# Patient Record
Sex: FEMALE | Race: WHITE | NOT HISPANIC OR LATINO | Employment: OTHER | URBAN - METROPOLITAN AREA
[De-identification: names, ages, dates, MRNs, and addresses within clinical notes are randomized per-mention and may not be internally consistent; named-entity substitution may affect disease eponyms.]

---

## 2018-07-19 ENCOUNTER — TRANSCRIBE ORDERS (OUTPATIENT)
Dept: ADMINISTRATIVE | Facility: HOSPITAL | Age: 68
End: 2018-07-19

## 2018-07-19 DIAGNOSIS — R22.0 INTRACRANIAL SWELLING: Primary | ICD-10-CM

## 2018-07-19 DIAGNOSIS — R22.0 MASS OF HEAD: ICD-10-CM

## 2018-07-24 ENCOUNTER — HOSPITAL ENCOUNTER (OUTPATIENT)
Dept: CT IMAGING | Facility: HOSPITAL | Age: 68
Discharge: HOME | End: 2018-07-24
Payer: COMMERCIAL

## 2018-07-24 DIAGNOSIS — R22.0 MASS OF HEAD: ICD-10-CM

## 2018-07-24 PROCEDURE — 70486 CT MAXILLOFACIAL W/O DYE: CPT

## 2020-02-17 ENCOUNTER — HOSPITAL ENCOUNTER (EMERGENCY)
Facility: HOSPITAL | Age: 70
Discharge: HOME | End: 2020-02-17
Attending: EMERGENCY MEDICINE
Payer: COMMERCIAL

## 2020-02-17 ENCOUNTER — HOSPITAL ENCOUNTER (OUTPATIENT)
Dept: CT IMAGING | Facility: HOSPITAL | Age: 70
Discharge: HOME | End: 2020-02-17
Payer: COMMERCIAL

## 2020-02-17 ENCOUNTER — TRANSCRIBE ORDERS (OUTPATIENT)
Dept: ADMINISTRATIVE | Facility: HOSPITAL | Age: 70
End: 2020-02-17

## 2020-02-17 VITALS
DIASTOLIC BLOOD PRESSURE: 81 MMHG | TEMPERATURE: 97.4 F | HEART RATE: 64 BPM | BODY MASS INDEX: 29.26 KG/M2 | SYSTOLIC BLOOD PRESSURE: 179 MMHG | RESPIRATION RATE: 18 BRPM | WEIGHT: 140 LBS | OXYGEN SATURATION: 97 %

## 2020-02-17 DIAGNOSIS — S09.90XA INJURY OF HEAD, INITIAL ENCOUNTER: Primary | ICD-10-CM

## 2020-02-17 DIAGNOSIS — S09.90XA HEAD INJURY: ICD-10-CM

## 2020-02-17 DIAGNOSIS — M54.2 NECK PAIN: ICD-10-CM

## 2020-02-17 DIAGNOSIS — R51.9 FACIAL PAIN: ICD-10-CM

## 2020-02-17 DIAGNOSIS — S09.90XA INJURY OF HEAD, INITIAL ENCOUNTER: ICD-10-CM
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
## 2020-02-17 DIAGNOSIS — S02.85XA: ICD-10-CM

## 2020-02-17 PROCEDURE — 99282 EMERGENCY DEPT VISIT SF MDM: CPT | Performed by: SURGERY

## 2020-02-17 PROCEDURE — 99284 EMERGENCY DEPT VISIT MOD MDM: CPT | Performed by: EMERGENCY MEDICINE

## 2020-02-17 PROCEDURE — 99283 EMERGENCY DEPT VISIT LOW MDM: CPT

## 2020-02-17 PROCEDURE — 70450 CT HEAD/BRAIN W/O DYE: CPT

## 2020-02-18 NOTE — ED ATTENDING ATTESTATION
2/17/2020  Roque Kee DO, saw and evaluated the patient  I have discussed the patient with the resident/non-physician practitioner and agree with the resident's/non-physician practitioner's findings, Plan of Care, and MDM as documented in the resident's/non-physician practitioner's note, except where noted  All available labs and Radiology studies were reviewed  I was present for key portions of any procedure(s) performed by the resident/non-physician practitioner and I was immediately available to provide assistance  At this point I agree with the current assessment done in the Emergency Department  I have conducted an independent evaluation of this patient a history and physical is as follows:    67 yo female presents for evaluation of multiple facial fx identified on CT following a fall a couple days ago  Pt offers no c/o  CT as follows:  1  Comminuted fracture left zygomatic arch  2   Comminuted fractures of the anterior and posterior walls of the left maxillary sinus  Oral maxillofacial evaluation/trauma evaluation advised  3   Comminuted fractures of the greater wing of the sphenoid bone and orbital floor on the left  In aggregate findings compatible with a zygomaticomaxillary complex  (ZMC) type fracture on the left  4   No acute intracranial hemorrhage, mass effect or edema  5   Moderate, chronic microangiopathy        Imp: multiple facial fx  Plan: d/w OMS, trauma        ED Course         Critical Care Time  Procedures

## 2020-02-18 NOTE — ED PROVIDER NOTES
History  Chief Complaint   Patient presents with    Fall     Pt reports falling down 4-5 stairs and hit her head  The fall happened last sunday, pt now c/o of numbness on the left side of her nose and upper lip      Patient presents for evaluation of facial injuries  She states that last week she was in [facility] when she was going down some steps, misjudged them, and fell down approximately 4-6 steps striking the left side of her face on the ground  Immediately afterwards she had onset of pain on the left side and could hear what was going on around her, however, could not originally moved for couple of seconds  She was then evaluated by EMS who instructed her to be seen at hospital should any of her symptoms worsen  Since then she has had some discomfort on the left side of her face as well as some numbness on the left side of her nose and left medial upper lip  Denies any changes in vision, radiation of pain, headaches, nausea/vomiting  A CT scan of her face was ordered by her primary care physician which showed multiple fractures she was instructed come into emergency department to be further evaluated  Prior to Admission Medications   Prescriptions Last Dose Informant Patient Reported?  Taking?   doxazosin (CARDURA) 2 mg tablet Not Taking at Unknown time  Yes No   Sig: Take 2 mg by mouth daily at bedtime   hydrochlorothiazide (HYDRODIURIL) 25 mg tablet 2/16/2020 at 2100  Yes Yes   Sig: Take 25 mg by mouth daily   lisinopril (ZESTRIL) 40 mg tablet 2/16/2020 at 2100  Yes Yes   Sig: Take 40 mg by mouth daily   metoprolol tartrate (LOPRESSOR) 100 mg tablet 2/16/2020 at 2100  Yes Yes   Sig: Take 100 mg by mouth 2 (two) times a day   potassium chloride SA (KLOR-CON M15) 15 MEQ tablet 2/16/2020 at 2100  Yes Yes   Sig: Take 15 mEq by mouth 2 (two) times a day      Facility-Administered Medications: None       Past Medical History:   Diagnosis Date    Hypertension        Past Surgical History: Procedure Laterality Date    APPENDECTOMY         History reviewed  No pertinent family history  I have reviewed and agree with the history as documented  Social History     Tobacco Use    Smoking status: Never Smoker    Smokeless tobacco: Never Used   Substance Use Topics    Alcohol use: No    Drug use: No        Review of Systems   Constitutional: Negative for chills, diaphoresis, fatigue and fever  Eyes: Negative for pain and visual disturbance  Respiratory: Negative for cough and shortness of breath  Cardiovascular: Negative for chest pain and palpitations  Gastrointestinal: Negative for abdominal distention, abdominal pain, constipation, diarrhea, nausea and vomiting  Genitourinary: Negative for dysuria, frequency and hematuria  Musculoskeletal: Negative for arthralgias, myalgias and neck pain  Neurological: Positive for numbness  Negative for dizziness, syncope, light-headedness and headaches  All other systems reviewed and are negative  Physical Exam  ED Triage Vitals   Temperature Pulse Respirations Blood Pressure SpO2   02/17/20 1735 02/17/20 1735 02/17/20 1735 02/17/20 1735 02/17/20 1735   (!) 97 4 °F (36 3 °C) 71 16 (!) 182/102 100 %      Temp Source Heart Rate Source Patient Position - Orthostatic VS BP Location FiO2 (%)   02/17/20 1735 02/17/20 1735 02/17/20 1735 02/17/20 1735 --   Oral Monitor Sitting Left arm       Pain Score       02/17/20 2000       No Pain             Orthostatic Vital Signs  Vitals:    02/17/20 1735 02/17/20 1921 02/17/20 2000   BP: (!) 182/102 (!) 208/93 (!) 179/81   Pulse: 71 60 64   Patient Position - Orthostatic VS: Sitting Lying Lying       Physical Exam   Constitutional: She is oriented to person, place, and time  She appears well-nourished  No distress  HENT:   Head: Normocephalic         Right Ear: External ear normal    Left Ear: External ear normal    Mouth/Throat: Oropharynx is clear and moist    Eyes: Pupils are equal, round, and reactive to light  Conjunctivae and EOM are normal  Right eye exhibits no discharge  Left eye exhibits no discharge  No scleral icterus  No changes in vision, no pain with extraocular motion   Neck: Normal range of motion  Neck supple  No JVD present  Cardiovascular: Normal rate, regular rhythm, normal heart sounds and intact distal pulses  Exam reveals no gallop and no friction rub  No murmur heard  Pulmonary/Chest: Effort normal and breath sounds normal  No respiratory distress  She has no wheezes  She has no rales  Abdominal: Soft  Bowel sounds are normal  She exhibits no distension and no mass  There is no tenderness  There is no guarding  Musculoskeletal: Normal range of motion  She exhibits no tenderness or deformity  Neurological: She is alert and oriented to person, place, and time  No cranial nerve deficit or sensory deficit  She exhibits normal muscle tone  Coordination normal    Skin: Skin is warm  She is not diaphoretic  Psychiatric: She has a normal mood and affect  Her behavior is normal  Judgment and thought content normal    Vitals reviewed  ED Medications  Medications - No data to display    Diagnostic Studies  Results Reviewed     None                 No orders to display         Procedures  Procedures      ED Course           Identification of Seniors at Risk      Most Recent Value   (ISAR) Identification of Seniors at Risk   Before the illness or injury that brought you to the Emergency, did you need someone to help you on a regular basis? 0 Filed at: 02/17/2020 1737   In the last 24 hours, have you needed more help than usual?  0 Filed at: 02/17/2020 1737   Have you been hospitalized for one or more nights during the past 6 months? 0 Filed at: 02/17/2020 1737   In general, do you see well?  0 Filed at: 02/17/2020 1737   In general, do you have serious problems with your memory? 0 Filed at: 02/17/2020 1737   Do you take more than three different medications every day?   1 Filed at: 02/17/2020 1737   ISAR Score  1 Filed at: 02/17/2020 1737                          [facility]  Number of Diagnoses or Management Options  Fall:   Head injury:   Orbit fracture, left:   Diagnosis management comments: Patient was seen and evaluated by Trauma  I discussed the patient with OMFS on-call who indicated that her current injuries were not surgical in nature and that she could follow-up with them in their office on an outpatient basis  Discharged with instructions to call and set up an appointment with OMFS, return to emergency department for any changes in vision, severe pain  Disposition  Final diagnoses:   Fall   Head injury   Orbit fracture, left     Time reflects when diagnosis was documented in both MDM as applicable and the Disposition within this note     Time User Action Codes Description Comment    2/17/2020  8:28 PM Marcello Reid Add [W19  XXXA] Fall     2/17/2020  8:28 PM Marcello Reid Add [Q33 28XS] Head injury     2/17/2020  8:29 PM Mary Cueva Add [T70 05MK] Orbit fracture, left       ED Disposition     ED Disposition Condition Date/Time Comment    Discharge Stable Mon Feb 17, 2020  8:28 PM [facility] discharge to home/self care  Follow-up Information     Follow up With Specialties Details Why [address], MD Internal Medicine   [address] Oral Maxillofacial Surgery Schedule an appointment as soon as possible for a visit in 1 week Your care was discussed with this physician during your ED visit   Please make an appointment with them for follow up  [facility] [address] [place] [address]  [phone]            Discharge Medication List as of 2/17/2020  8:42 PM      CONTINUE these medications which have NOT CHANGED    Details   hydrochlorothiazide (HYDRODIURIL) 25 mg tablet Take 25 mg by mouth daily, Until Discontinued, Historical Med      lisinopril (ZESTRIL) 40 mg tablet Take 40 mg by mouth daily, Until Discontinued, Historical Med      metoprolol tartrate (LOPRESSOR) 100 mg tablet Take 100 mg by mouth 2 (two) times a day, Until Discontinued, Historical Med      potassium chloride SA (KLOR-CON M15) 15 MEQ tablet Take 15 mEq by mouth 2 (two) times a day, Until Discontinued, Historical Med      doxazosin (CARDURA) 2 mg tablet Take 2 mg by mouth daily at bedtime, Until Discontinued, Historical Med           No discharge procedures on file  ED Provider  Attending physically available and evaluated Marycarmelo Gisele CARABALLO managed the patient along with the ED Attending      Electronically Signed by         Nathaniel Mcgrath MD  02/17/20 2749

## 2020-02-18 NOTE — CONSULTS
Evaluation - Trauma   Terese Terry 68 y o  female MRN: [id number]  Unit/Bed#: ED 14 Encounter: [id number]    Assessment/Plan   Trauma Alert: Evaluation  Model of Arrival: Ambulance  Trauma Team: Attending Isai Isbell, Residents Bud Villa and Fellow Petit-Me  Consultants: None    Trauma Active Problems: Multiple L-sided facial fractures    Trauma Plan: discharge with outpatient OMFS follow up    Chief Complaint:   L-sided facial pain  History of Present Illness   Physician Requesting Evaluation: No att  providers found  Reason for Evaluation / Principal Problem: transferred for further evaluation by PCP  HPI:  Elayne Alvarez is a 68 y o  female who presents at the request of her PCP  Patient was walking down a flight of concrete steps eight days ago while on vacation in [place] and   She fell down the steps because she feels like she was just going too fast and her feet couldn't catch up and she fell forward  Patient landed on her face  She did not lose consciousness and doesn't take any AC/AP medications  Patient was specifically asked about aspirin, coumadin, and the noacs  She denied them all  She finished her vacation, flew back home, and has still been having pain  She went to her PCP, who ordered an x-ray and then sent her to the ED who ordered a CT scan, who then sent her here for a trauma/OMFS eval  Patient denies any pain but does state the area of skin inferior to her orbit is slightly numb  Denies any maxillary teeth numbness  Mechanism:Fall    Review of Systems   Constitutional: Negative for chills, fatigue and fever  HENT: Negative for congestion, rhinorrhea, sinus pressure and sore throat  Facial tenderness   Eyes: Negative for visual disturbance  Respiratory: Negative for cough and shortness of breath  Cardiovascular: Negative for chest pain  Gastrointestinal: Negative for abdominal pain, constipation, diarrhea, nausea and vomiting     Genitourinary: Negative for dysuria, frequency, hematuria and urgency  Musculoskeletal: Negative for arthralgias and myalgias  Skin: Negative for color change and rash  Neurological: Negative for dizziness, light-headedness and numbness  Historical Information   History is unobtainable from the patient due to nothing  Efforts to obtain history included the following sources: family member, obtained from other records    Past Medical History:   Diagnosis Date    Hypertension      Past Surgical History:   Procedure Laterality Date    APPENDECTOMY       Social History   Social History     Substance and Sexual Activity   Alcohol Use No     Social History     Substance and Sexual Activity   Drug Use No     Social History     Tobacco Use   Smoking Status Never Smoker   Smokeless Tobacco Never Used       There is no immunization history on file for this patient  Last Tetanus: unknown  Family History: Non-contributory  Unable to obtain/limited by nothing      Meds/Allergies   all current active meds have been reviewed and current meds:   No current facility-administered medications for this encounter  No Known Allergies      Objective   Vitals:   First set: Temperature: (!) 97 4 °F (36 3 °C) (02/17/20 1735)  Pulse: 71 (02/17/20 1735)  Respirations: 16 (02/17/20 1735)  Blood Pressure: (!) 182/102 (02/17/20 1735)    Invasive Devices     None                 Neurologic Exam     Mental Status   Oriented to person, place, and time  Cranial Nerves     CN III, IV, VI   Pupils are equal, round, and reactive to light  Extraocular motions are normal      Physical Exam   Constitutional: She is oriented to person, place, and time  She appears well-developed and well-nourished  No distress  HENT:   Head: Normocephalic and atraumatic  Facial tenderness with small abrasion  Minimal edema  Decreased sensation just inferior to L orbit when compared to the R    Eyes: Pupils are equal, round, and reactive to light   Conjunctivae and EOM are normal  Right eye exhibits no discharge  Left eye exhibits no discharge  No scleral icterus  Neck: Normal range of motion  Neck supple  No JVD present  Cardiovascular: Normal rate, regular rhythm, normal heart sounds and intact distal pulses  Exam reveals no gallop and no friction rub  No murmur heard  Pulmonary/Chest: Effort normal and breath sounds normal  No stridor  No respiratory distress  She has no wheezes  She has no rales  She exhibits no tenderness  Abdominal: Soft  Bowel sounds are normal  She exhibits no distension  There is no tenderness  There is no guarding  Musculoskeletal: Normal range of motion  She exhibits no edema, tenderness or deformity  Neurological: She is alert and oriented to person, place, and time  No cranial nerve deficit or sensory deficit  She exhibits normal muscle tone  Skin: Skin is warm and dry  No rash noted  She is not diaphoretic  No erythema  No pallor  Psychiatric: She has a normal mood and affect  Her behavior is normal    Nursing note and vitals reviewed  PE limited by: nothing    Lab Results: no labs drawn  Imaging/EKG Studies: Ct Head Wo Contrast    Result Date: 2/17/2020  Impression: 1  Comminuted fracture left zygomatic arch  2   Comminuted fractures of the anterior and posterior walls of the left maxillary sinus  Oral maxillofacial evaluation/trauma evaluation advised  3   Comminuted fractures of the greater wing of the sphenoid bone and orbital floor on the left  In aggregate findings compatible with a zygomaticomaxillary complex  (ZMC) type fracture on the left  4   No acute intracranial hemorrhage, mass effect or edema  5   Moderate, chronic microangiopathy  Workstation performed: [id number]     Other Studies: none    Code Status: No Order  Advance Directive and Living Will:      Power of :    POLST:      Counseling / Coordination of Care  Total Critical Care time spent 15 minutes excluding procedures, teaching and family updates

## 2020-02-19 ENCOUNTER — HOSPITAL ENCOUNTER (OUTPATIENT)
Dept: RADIOLOGY | Facility: HOSPITAL | Age: 70
Discharge: HOME | End: 2020-02-19
Payer: COMMERCIAL

## 2020-02-19 DIAGNOSIS — S09.90XA INJURY OF HEAD, INITIAL ENCOUNTER: ICD-10-CM

## 2020-02-19 DIAGNOSIS — M54.2 NECK PAIN: ICD-10-CM

## 2020-02-19 PROCEDURE — 72141 MRI NECK SPINE W/O DYE: CPT

## 2020-03-04 ENCOUNTER — APPOINTMENT (OUTPATIENT)
Dept: RADIOLOGY | Facility: AMBULARY SURGERY CENTER | Age: 70
End: 2020-03-04
Attending: ORTHOPAEDIC SURGERY
Payer: COMMERCIAL

## 2020-03-04 ENCOUNTER — OFFICE VISIT (OUTPATIENT)
Dept: OBGYN CLINIC | Facility: CLINIC | Age: 70
End: 2020-03-04
Payer: COMMERCIAL

## 2020-03-04 VITALS
HEART RATE: 66 BPM | HEIGHT: 58 IN | BODY MASS INDEX: 29.39 KG/M2 | SYSTOLIC BLOOD PRESSURE: 188 MMHG | WEIGHT: 140 LBS | DIASTOLIC BLOOD PRESSURE: 83 MMHG

## 2020-03-04 DIAGNOSIS — M25.511 ACUTE PAIN OF RIGHT SHOULDER: ICD-10-CM

## 2020-03-04 DIAGNOSIS — S63.681A SPRAIN OF OTHER SITE OF RIGHT THUMB, INITIAL ENCOUNTER: ICD-10-CM

## 2020-03-04 DIAGNOSIS — M79.644 THUMB PAIN, RIGHT: ICD-10-CM

## 2020-03-04 DIAGNOSIS — S43.014A ANTERIOR DISLOCATION OF RIGHT SHOULDER, INITIAL ENCOUNTER: Primary | ICD-10-CM

## 2020-03-04 PROCEDURE — 73140 X-RAY EXAM OF FINGER(S): CPT

## 2020-03-04 PROCEDURE — 99203 OFFICE O/P NEW LOW 30 MIN: CPT | Performed by: ORTHOPAEDIC SURGERY

## 2020-03-04 PROCEDURE — 73030 X-RAY EXAM OF SHOULDER: CPT
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
## 2020-04-28 ENCOUNTER — OFFICE VISIT (OUTPATIENT)
Dept: OBGYN CLINIC | Facility: CLINIC | Age: 70
End: 2020-04-28
Payer: COMMERCIAL

## 2020-04-28 VITALS
HEIGHT: 58 IN | WEIGHT: 140 LBS | SYSTOLIC BLOOD PRESSURE: 185 MMHG | HEART RATE: 80 BPM | BODY MASS INDEX: 29.39 KG/M2 | DIASTOLIC BLOOD PRESSURE: 105 MMHG

## 2020-04-28 DIAGNOSIS — S43.004D DISLOCATION OF RIGHT SHOULDER JOINT, SUBSEQUENT ENCOUNTER: Primary | ICD-10-CM

## 2020-04-28 PROBLEM — S43.004A DISLOCATION OF RIGHT SHOULDER JOINT: Status: ACTIVE | Noted: 2020-04-28

## 2020-04-28 PROCEDURE — 99213 OFFICE O/P EST LOW 20 MIN: CPT | Performed by: ORTHOPAEDIC SURGERY

## 2020-04-30 ENCOUNTER — EVALUATION (OUTPATIENT)
Dept: PHYSICAL THERAPY | Facility: CLINIC | Age: 70
End: 2020-04-30
Payer: COMMERCIAL

## 2020-04-30 DIAGNOSIS — S43.004D DISLOCATION OF RIGHT SHOULDER JOINT, SUBSEQUENT ENCOUNTER: ICD-10-CM

## 2020-04-30 PROCEDURE — 97110 THERAPEUTIC EXERCISES: CPT | Performed by: PHYSICAL THERAPIST

## 2020-04-30 PROCEDURE — 97161 PT EVAL LOW COMPLEX 20 MIN: CPT | Performed by: PHYSICAL THERAPIST

## 2020-05-04 ENCOUNTER — OFFICE VISIT (OUTPATIENT)
Dept: PHYSICAL THERAPY | Facility: CLINIC | Age: 70
End: 2020-05-04
Payer: COMMERCIAL

## 2020-05-04 DIAGNOSIS — S43.004D DISLOCATION OF RIGHT SHOULDER JOINT, SUBSEQUENT ENCOUNTER: Primary | ICD-10-CM

## 2020-05-04 PROCEDURE — 97110 THERAPEUTIC EXERCISES: CPT | Performed by: PHYSICAL THERAPIST

## 2020-05-04 PROCEDURE — 97140 MANUAL THERAPY 1/> REGIONS: CPT | Performed by: PHYSICAL THERAPIST

## 2020-05-04 PROCEDURE — 97112 NEUROMUSCULAR REEDUCATION: CPT | Performed by: PHYSICAL THERAPIST

## 2020-05-07 ENCOUNTER — OFFICE VISIT (OUTPATIENT)
Dept: PHYSICAL THERAPY | Facility: CLINIC | Age: 70
End: 2020-05-07
Payer: COMMERCIAL

## 2020-05-07 DIAGNOSIS — S43.004D DISLOCATION OF RIGHT SHOULDER JOINT, SUBSEQUENT ENCOUNTER: Primary | ICD-10-CM

## 2020-05-07 PROCEDURE — 97140 MANUAL THERAPY 1/> REGIONS: CPT | Performed by: PHYSICAL THERAPIST

## 2020-05-07 PROCEDURE — 97112 NEUROMUSCULAR REEDUCATION: CPT | Performed by: PHYSICAL THERAPIST

## 2020-05-07 PROCEDURE — 97110 THERAPEUTIC EXERCISES: CPT | Performed by: PHYSICAL THERAPIST

## 2020-05-12 ENCOUNTER — OFFICE VISIT (OUTPATIENT)
Dept: PHYSICAL THERAPY | Facility: CLINIC | Age: 70
End: 2020-05-12
Payer: COMMERCIAL

## 2020-05-12 DIAGNOSIS — S43.004D DISLOCATION OF RIGHT SHOULDER JOINT, SUBSEQUENT ENCOUNTER: Primary | ICD-10-CM

## 2020-05-12 PROCEDURE — 97110 THERAPEUTIC EXERCISES: CPT | Performed by: PHYSICAL THERAPIST

## 2020-05-12 PROCEDURE — 97140 MANUAL THERAPY 1/> REGIONS: CPT | Performed by: PHYSICAL THERAPIST

## 2020-05-12 PROCEDURE — 97112 NEUROMUSCULAR REEDUCATION: CPT | Performed by: PHYSICAL THERAPIST

## 2020-05-15 ENCOUNTER — OFFICE VISIT (OUTPATIENT)
Dept: PHYSICAL THERAPY | Facility: CLINIC | Age: 70
End: 2020-05-15
Payer: COMMERCIAL

## 2020-05-15 DIAGNOSIS — S43.004D DISLOCATION OF RIGHT SHOULDER JOINT, SUBSEQUENT ENCOUNTER: Primary | ICD-10-CM

## 2020-05-15 PROCEDURE — 97112 NEUROMUSCULAR REEDUCATION: CPT | Performed by: PHYSICAL THERAPIST

## 2020-05-15 PROCEDURE — 97140 MANUAL THERAPY 1/> REGIONS: CPT | Performed by: PHYSICAL THERAPIST

## 2020-05-15 PROCEDURE — 97110 THERAPEUTIC EXERCISES: CPT | Performed by: PHYSICAL THERAPIST

## 2020-05-19 ENCOUNTER — OFFICE VISIT (OUTPATIENT)
Dept: PHYSICAL THERAPY | Facility: CLINIC | Age: 70
End: 2020-05-19
Payer: COMMERCIAL

## 2020-05-19 DIAGNOSIS — S43.004D DISLOCATION OF RIGHT SHOULDER JOINT, SUBSEQUENT ENCOUNTER: Primary | ICD-10-CM

## 2020-05-19 PROCEDURE — 97112 NEUROMUSCULAR REEDUCATION: CPT | Performed by: PHYSICAL THERAPIST

## 2020-05-19 PROCEDURE — 97140 MANUAL THERAPY 1/> REGIONS: CPT | Performed by: PHYSICAL THERAPIST

## 2020-05-19 PROCEDURE — 97110 THERAPEUTIC EXERCISES: CPT | Performed by: PHYSICAL THERAPIST

## 2020-05-27 ENCOUNTER — EVALUATION (OUTPATIENT)
Dept: PHYSICAL THERAPY | Facility: CLINIC | Age: 70
End: 2020-05-27
Payer: COMMERCIAL

## 2020-05-27 DIAGNOSIS — S43.004D DISLOCATION OF RIGHT SHOULDER JOINT, SUBSEQUENT ENCOUNTER: Primary | ICD-10-CM

## 2020-05-27 PROCEDURE — 97110 THERAPEUTIC EXERCISES: CPT | Performed by: PHYSICAL THERAPIST

## 2020-05-27 PROCEDURE — 97112 NEUROMUSCULAR REEDUCATION: CPT | Performed by: PHYSICAL THERAPIST

## 2020-05-27 PROCEDURE — 97140 MANUAL THERAPY 1/> REGIONS: CPT | Performed by: PHYSICAL THERAPIST

## 2020-06-03 ENCOUNTER — OFFICE VISIT (OUTPATIENT)
Dept: PHYSICAL THERAPY | Facility: CLINIC | Age: 70
End: 2020-06-03
Payer: COMMERCIAL

## 2020-06-03 DIAGNOSIS — S43.004D DISLOCATION OF RIGHT SHOULDER JOINT, SUBSEQUENT ENCOUNTER: Primary | ICD-10-CM

## 2020-06-03 PROCEDURE — 97110 THERAPEUTIC EXERCISES: CPT | Performed by: PHYSICAL THERAPIST

## 2020-06-03 PROCEDURE — 97140 MANUAL THERAPY 1/> REGIONS: CPT | Performed by: PHYSICAL THERAPIST

## 2020-06-03 PROCEDURE — 97112 NEUROMUSCULAR REEDUCATION: CPT | Performed by: PHYSICAL THERAPIST

## 2020-06-09 ENCOUNTER — APPOINTMENT (OUTPATIENT)
Dept: PHYSICAL THERAPY | Facility: CLINIC | Age: 70
End: 2020-06-09
Payer: COMMERCIAL

## 2020-06-19 ENCOUNTER — OFFICE VISIT (OUTPATIENT)
Dept: PHYSICAL THERAPY | Facility: CLINIC | Age: 70
End: 2020-06-19
Payer: COMMERCIAL

## 2020-06-19 DIAGNOSIS — S43.004D DISLOCATION OF RIGHT SHOULDER JOINT, SUBSEQUENT ENCOUNTER: Primary | ICD-10-CM

## 2020-06-19 PROCEDURE — 97112 NEUROMUSCULAR REEDUCATION: CPT | Performed by: PHYSICAL THERAPIST

## 2020-06-19 PROCEDURE — 97110 THERAPEUTIC EXERCISES: CPT | Performed by: PHYSICAL THERAPIST

## 2020-06-19 PROCEDURE — 97140 MANUAL THERAPY 1/> REGIONS: CPT | Performed by: PHYSICAL THERAPIST

## 2020-06-25 ENCOUNTER — OFFICE VISIT (OUTPATIENT)
Dept: PHYSICAL THERAPY | Facility: CLINIC | Age: 70
End: 2020-06-25
Payer: COMMERCIAL

## 2020-06-25 DIAGNOSIS — S43.004D DISLOCATION OF RIGHT SHOULDER JOINT, SUBSEQUENT ENCOUNTER: Primary | ICD-10-CM

## 2020-06-25 PROCEDURE — 97112 NEUROMUSCULAR REEDUCATION: CPT | Performed by: PHYSICAL THERAPIST

## 2020-06-25 PROCEDURE — 97110 THERAPEUTIC EXERCISES: CPT | Performed by: PHYSICAL THERAPIST

## 2020-06-25 PROCEDURE — 97140 MANUAL THERAPY 1/> REGIONS: CPT | Performed by: PHYSICAL THERAPIST

## 2020-06-30 ENCOUNTER — EVALUATION (OUTPATIENT)
Dept: PHYSICAL THERAPY | Facility: CLINIC | Age: 70
End: 2020-06-30
Payer: COMMERCIAL

## 2020-06-30 DIAGNOSIS — S43.004D DISLOCATION OF RIGHT SHOULDER JOINT, SUBSEQUENT ENCOUNTER: Primary | ICD-10-CM

## 2020-06-30 PROCEDURE — 97140 MANUAL THERAPY 1/> REGIONS: CPT | Performed by: PHYSICAL THERAPIST

## 2020-06-30 PROCEDURE — 97110 THERAPEUTIC EXERCISES: CPT | Performed by: PHYSICAL THERAPIST

## 2020-10-08 ENCOUNTER — EVALUATION (OUTPATIENT)
Dept: PHYSICAL THERAPY | Facility: CLINIC | Age: 70
End: 2020-10-08
Payer: COMMERCIAL

## 2020-10-08 DIAGNOSIS — G89.29 CHRONIC RIGHT SHOULDER PAIN: Primary | ICD-10-CM

## 2020-10-08 DIAGNOSIS — M25.511 CHRONIC RIGHT SHOULDER PAIN: Primary | ICD-10-CM

## 2020-10-08 PROCEDURE — 97110 THERAPEUTIC EXERCISES: CPT | Performed by: PHYSICAL THERAPIST

## 2020-10-08 PROCEDURE — 97140 MANUAL THERAPY 1/> REGIONS: CPT | Performed by: PHYSICAL THERAPIST

## 2020-10-08 PROCEDURE — 97161 PT EVAL LOW COMPLEX 20 MIN: CPT | Performed by: PHYSICAL THERAPIST

## 2020-10-12 ENCOUNTER — OFFICE VISIT (OUTPATIENT)
Dept: PHYSICAL THERAPY | Facility: CLINIC | Age: 70
End: 2020-10-12
Payer: COMMERCIAL

## 2020-10-12 DIAGNOSIS — M25.511 CHRONIC RIGHT SHOULDER PAIN: Primary | ICD-10-CM

## 2020-10-12 DIAGNOSIS — G89.29 CHRONIC RIGHT SHOULDER PAIN: Primary | ICD-10-CM

## 2020-10-12 PROCEDURE — 97140 MANUAL THERAPY 1/> REGIONS: CPT | Performed by: PHYSICAL THERAPIST

## 2020-10-12 PROCEDURE — 97110 THERAPEUTIC EXERCISES: CPT | Performed by: PHYSICAL THERAPIST

## 2020-10-12 PROCEDURE — 97112 NEUROMUSCULAR REEDUCATION: CPT | Performed by: PHYSICAL THERAPIST

## 2020-10-16 ENCOUNTER — OFFICE VISIT (OUTPATIENT)
Dept: PHYSICAL THERAPY | Facility: CLINIC | Age: 70
End: 2020-10-16
Payer: COMMERCIAL

## 2020-10-16 DIAGNOSIS — M25.511 CHRONIC RIGHT SHOULDER PAIN: Primary | ICD-10-CM

## 2020-10-16 DIAGNOSIS — G89.29 CHRONIC RIGHT SHOULDER PAIN: Primary | ICD-10-CM

## 2020-10-16 PROCEDURE — 97112 NEUROMUSCULAR REEDUCATION: CPT | Performed by: PHYSICAL THERAPIST

## 2020-10-16 PROCEDURE — 97110 THERAPEUTIC EXERCISES: CPT | Performed by: PHYSICAL THERAPIST

## 2020-10-16 PROCEDURE — 97140 MANUAL THERAPY 1/> REGIONS: CPT | Performed by: PHYSICAL THERAPIST

## 2020-10-19 ENCOUNTER — OFFICE VISIT (OUTPATIENT)
Dept: PHYSICAL THERAPY | Facility: CLINIC | Age: 70
End: 2020-10-19
Payer: COMMERCIAL

## 2020-10-19 DIAGNOSIS — M25.511 CHRONIC RIGHT SHOULDER PAIN: Primary | ICD-10-CM

## 2020-10-19 DIAGNOSIS — G89.29 CHRONIC RIGHT SHOULDER PAIN: Primary | ICD-10-CM

## 2020-10-19 PROCEDURE — 97140 MANUAL THERAPY 1/> REGIONS: CPT | Performed by: PHYSICAL THERAPIST

## 2020-10-19 PROCEDURE — 97112 NEUROMUSCULAR REEDUCATION: CPT | Performed by: PHYSICAL THERAPIST

## 2020-10-19 PROCEDURE — 97110 THERAPEUTIC EXERCISES: CPT | Performed by: PHYSICAL THERAPIST

## 2020-10-23 ENCOUNTER — OFFICE VISIT (OUTPATIENT)
Dept: PHYSICAL THERAPY | Facility: CLINIC | Age: 70
End: 2020-10-23
Payer: COMMERCIAL

## 2020-10-23 DIAGNOSIS — G89.29 CHRONIC RIGHT SHOULDER PAIN: Primary | ICD-10-CM

## 2020-10-23 DIAGNOSIS — M25.511 CHRONIC RIGHT SHOULDER PAIN: Primary | ICD-10-CM

## 2020-10-23 PROCEDURE — 97140 MANUAL THERAPY 1/> REGIONS: CPT | Performed by: PHYSICAL THERAPIST

## 2020-10-23 PROCEDURE — 97110 THERAPEUTIC EXERCISES: CPT | Performed by: PHYSICAL THERAPIST

## 2020-10-23 PROCEDURE — 97112 NEUROMUSCULAR REEDUCATION: CPT | Performed by: PHYSICAL THERAPIST

## 2020-10-28 ENCOUNTER — OFFICE VISIT (OUTPATIENT)
Dept: PHYSICAL THERAPY | Facility: CLINIC | Age: 70
End: 2020-10-28
Payer: COMMERCIAL

## 2020-10-28 DIAGNOSIS — M25.511 CHRONIC RIGHT SHOULDER PAIN: Primary | ICD-10-CM

## 2020-10-28 DIAGNOSIS — G89.29 CHRONIC RIGHT SHOULDER PAIN: Primary | ICD-10-CM

## 2020-10-28 PROCEDURE — 97140 MANUAL THERAPY 1/> REGIONS: CPT | Performed by: PHYSICAL THERAPIST

## 2020-10-28 PROCEDURE — 97110 THERAPEUTIC EXERCISES: CPT | Performed by: PHYSICAL THERAPIST

## 2020-10-28 PROCEDURE — 97112 NEUROMUSCULAR REEDUCATION: CPT | Performed by: PHYSICAL THERAPIST

## 2020-10-30 ENCOUNTER — OFFICE VISIT (OUTPATIENT)
Dept: PHYSICAL THERAPY | Facility: CLINIC | Age: 70
End: 2020-10-30
Payer: COMMERCIAL

## 2020-10-30 DIAGNOSIS — G89.29 CHRONIC RIGHT SHOULDER PAIN: Primary | ICD-10-CM

## 2020-10-30 DIAGNOSIS — M25.511 CHRONIC RIGHT SHOULDER PAIN: Primary | ICD-10-CM

## 2020-10-30 PROCEDURE — 97110 THERAPEUTIC EXERCISES: CPT | Performed by: PHYSICAL THERAPIST

## 2020-10-30 PROCEDURE — 97140 MANUAL THERAPY 1/> REGIONS: CPT | Performed by: PHYSICAL THERAPIST

## 2020-10-30 PROCEDURE — 97112 NEUROMUSCULAR REEDUCATION: CPT | Performed by: PHYSICAL THERAPIST

## 2020-11-03 ENCOUNTER — OFFICE VISIT (OUTPATIENT)
Dept: PHYSICAL THERAPY | Facility: CLINIC | Age: 70
End: 2020-11-03
Payer: COMMERCIAL

## 2020-11-03 DIAGNOSIS — G89.29 CHRONIC RIGHT SHOULDER PAIN: Primary | ICD-10-CM

## 2020-11-03 DIAGNOSIS — M25.511 CHRONIC RIGHT SHOULDER PAIN: Primary | ICD-10-CM

## 2020-11-03 PROCEDURE — 97112 NEUROMUSCULAR REEDUCATION: CPT | Performed by: PHYSICAL THERAPIST

## 2020-11-03 PROCEDURE — 97110 THERAPEUTIC EXERCISES: CPT | Performed by: PHYSICAL THERAPIST

## 2020-11-03 PROCEDURE — 97140 MANUAL THERAPY 1/> REGIONS: CPT | Performed by: PHYSICAL THERAPIST

## 2020-11-06 ENCOUNTER — OFFICE VISIT (OUTPATIENT)
Dept: PHYSICAL THERAPY | Facility: CLINIC | Age: 70
End: 2020-11-06
Payer: COMMERCIAL

## 2020-11-06 DIAGNOSIS — M25.511 CHRONIC RIGHT SHOULDER PAIN: Primary | ICD-10-CM

## 2020-11-06 DIAGNOSIS — G89.29 CHRONIC RIGHT SHOULDER PAIN: Primary | ICD-10-CM

## 2020-11-06 PROCEDURE — 97110 THERAPEUTIC EXERCISES: CPT | Performed by: PHYSICAL THERAPIST

## 2020-11-06 PROCEDURE — 97140 MANUAL THERAPY 1/> REGIONS: CPT | Performed by: PHYSICAL THERAPIST

## 2020-11-20 ENCOUNTER — EVALUATION (OUTPATIENT)
Dept: PHYSICAL THERAPY | Facility: CLINIC | Age: 70
End: 2020-11-20
Payer: COMMERCIAL

## 2020-11-20 DIAGNOSIS — G89.29 CHRONIC RIGHT SHOULDER PAIN: Primary | ICD-10-CM

## 2020-11-20 DIAGNOSIS — M25.511 CHRONIC RIGHT SHOULDER PAIN: Primary | ICD-10-CM

## 2020-11-20 PROCEDURE — 97140 MANUAL THERAPY 1/> REGIONS: CPT | Performed by: PHYSICAL THERAPIST

## 2020-11-20 PROCEDURE — 97110 THERAPEUTIC EXERCISES: CPT | Performed by: PHYSICAL THERAPIST

## 2022-05-24 ENCOUNTER — TELEPHONE (OUTPATIENT)
Dept: OBGYN CLINIC | Facility: CLINIC | Age: 72
End: 2022-05-24

## 2022-05-24 NOTE — TELEPHONE ENCOUNTER
Pt's son, Christin Aparicio calling for pt  States Terese has been having incontinence issues for awhile and not concerned just thought aging but 3 nights ago "something changed" and pt told her  who assessed and told pt's son that "her insides are coming out " Pt told son this has been happening occasionally over time but now happening more frequent  Last 2 days pt has had constant urge to urinate, pain and burning that increases as the day goes on to the point where she can barely walk at end of the day  Pt taking advil for pain relief "which is a big deal for her because she never took anything ever"  Denies fever/chills  Pt's son Christin Aparicio aware will review and call him back with recommendations

## 2022-05-24 NOTE — TELEPHONE ENCOUNTER
Called pt's son back to make him aware no availability to see pt for acute problem at this time, [facility] office numbers provided to pt's son for attempt for pt to be seen with sooner availability  Pt's son verbalizes understanding

## 2022-05-26 ENCOUNTER — OFFICE VISIT (OUTPATIENT)
Dept: OBGYN CLINIC | Facility: CLINIC | Age: 72
End: 2022-05-26
Payer: COMMERCIAL

## 2022-05-26 VITALS
SYSTOLIC BLOOD PRESSURE: 150 MMHG | DIASTOLIC BLOOD PRESSURE: 82 MMHG | WEIGHT: 141 LBS | HEIGHT: 58 IN | BODY MASS INDEX: 29.6 KG/M2

## 2022-05-26 DIAGNOSIS — R30.0 DYSURIA: Primary | ICD-10-CM

## 2022-05-26 DIAGNOSIS — N81.3 THIRD DEGREE UTERINE PROLAPSE: ICD-10-CM

## 2022-05-26 DIAGNOSIS — R39.9 LOWER URINARY TRACT SYMPTOMS (LUTS): ICD-10-CM

## 2022-05-26 PROBLEM — I10 HYPERTENSION: Status: ACTIVE | Noted: 2022-05-26

## 2022-05-26 LAB
SL AMB  POCT GLUCOSE, UA: ABNORMAL
SL AMB LEUKOCYTE ESTERASE,UA: ABNORMAL
SL AMB POCT BILIRUBIN,UA: ABNORMAL
SL AMB POCT BLOOD,UA: ABNORMAL
SL AMB POCT CLARITY,UA: CLEAR
SL AMB POCT COLOR,UA: YELLOW
SL AMB POCT KETONES,UA: ABNORMAL
SL AMB POCT NITRITE,UA: ABNORMAL
SL AMB POCT PH,UA: 6.5
SL AMB POCT SPECIFIC GRAVITY,UA: 1
SL AMB POCT URINE PROTEIN: ABNORMAL
SL AMB POCT UROBILINOGEN: ABNORMAL

## 2022-05-26 PROCEDURE — 87147 CULTURE TYPE IMMUNOLOGIC: CPT | Performed by: NURSE PRACTITIONER

## 2022-05-26 PROCEDURE — 81002 URINALYSIS NONAUTO W/O SCOPE: CPT | Performed by: NURSE PRACTITIONER

## 2022-05-26 PROCEDURE — 99202 OFFICE O/P NEW SF 15 MIN: CPT | Performed by: NURSE PRACTITIONER

## 2022-05-26 PROCEDURE — 87086 URINE CULTURE/COLONY COUNT: CPT | Performed by: NURSE PRACTITIONER

## 2022-05-26 RX ORDER — METOPROLOL SUCCINATE 100 MG/1
100 TABLET, EXTENDED RELEASE ORAL DAILY
COMMUNITY
Start: 2022-05-19

## 2022-05-26 RX ORDER — NITROFURANTOIN 25; 75 MG/1; MG/1
100 CAPSULE ORAL 2 TIMES DAILY
Qty: 14 CAPSULE | Refills: 0 | Status: SHIPPED | OUTPATIENT
Start: 2022-05-26 | End: 2022-06-02

## 2022-05-26 RX ORDER — AMLODIPINE BESYLATE 5 MG/1
5 TABLET ORAL DAILY
COMMUNITY
Start: 2022-04-26

## 2022-05-26 RX ORDER — POTASSIUM CHLORIDE 750 MG/1
10 TABLET, FILM COATED, EXTENDED RELEASE ORAL DAILY
COMMUNITY
Start: 2022-05-20

## 2022-05-26 NOTE — PROGRESS NOTES
SUBJECTIVE:     66 y o  female   x 3 complains of not having control of her bladder and a burning sensation occasionally  Vaginal Complains pressure  Symptoms have been presents for about 2 weeks  Denies abnormal vaginal bleeding or significant pelvic pain or  fever  No UTI symptoms  Denies history of known exposure to STD  No LMP recorded  Patient is postmenopausal     OBJECTIVE:     Physical Exam  Constitutional:       Appearance: Normal appearance  Genitourinary:      Right Labia: No rash, tenderness, lesions, skin changes or Bartholin's cyst      Left Labia: No tenderness, lesions, skin changes, Bartholin's cyst or rash  No labial fusion noted  No vaginal atrophy present  Vaginal exam comments: Cervix is extending outside of the introitus - 3rd degree prolapse           Right Adnexa: not tender, not full and no mass present  Left Adnexa: not tender, not full and no mass present  Cervix is parous  No cervical discharge, friability, lesion, polyp or nabothian cyst       Uterus is prolapsed  Uterus is not enlarged or tender  Uterus is anteverted  HENT:      Head: Normocephalic and atraumatic  Pulmonary:      Effort: Pulmonary effort is normal    Musculoskeletal:      Cervical back: Neck supple  Neurological:      Mental Status: She is alert  Skin:     General: Skin is warm  Psychiatric:         Behavior: Behavior is cooperative  Vitals and nursing note reviewed  Results from last 7 days   Lab Units 22  1143   LEUKOCYTES UA  large   UROBILINOGEN UA  neg   PROTEIN UA  neg   PH UA  6 5   SPEC GRAV UA  1 000   KETONES UA  trace   BILIRUBIN UA POC  neg   GLUCOSE UA  neg   COLOR UA  yellow   CLARITY UA  clear         ASSESSMENT AND PLAN:     Terese was seen today for vaginal prolapse      Diagnoses and all orders for this visit:    Dysuria  -     POCT urine dip  -     Urine culture    Third degree uterine prolapse  -     Ambulatory Referral to Gynecology; Future  -     Urine culture    Lower urinary tract symptoms (LUTS)  -     nitrofurantoin (MACROBID) 100 mg capsule; Take 1 capsule (100 mg total) by mouth 2 (two) times a day for 7 days  -     Urine culture    Presumed UTI - treatment initiated  Urine culture sent  Referral for evaluation and treatment of 3rd degree uterine prolapse  Follow-up as needed

## 2022-05-27 LAB — BACTERIA UR CULT: ABNORMAL

## 2022-05-31 ENCOUNTER — TELEPHONE (OUTPATIENT)
Dept: OBGYN CLINIC | Facility: CLINIC | Age: 72
End: 2022-05-31

## 2022-05-31 DIAGNOSIS — N39.0 GROUP B STREPTOCOCCAL UTI: Primary | ICD-10-CM

## 2022-05-31 DIAGNOSIS — B95.1 GROUP B STREPTOCOCCAL UTI: Primary | ICD-10-CM

## 2022-05-31 RX ORDER — PENICILLIN V POTASSIUM 500 MG/1
500 TABLET ORAL EVERY 8 HOURS SCHEDULED
Qty: 21 TABLET | Refills: 0 | Status: SHIPPED | OUTPATIENT
Start: 2022-05-31 | End: 2022-06-07

## 2022-05-31 NOTE — TELEPHONE ENCOUNTER
----- Message from Natasha Gonzalez, Martha Marin sent at 5/31/2022  7:58 AM EDT -----  Please call patient and inform her that we need to change her antibiotic treatment for her Uti  Currently she is on Macrobid  Let her know that the bacteria found in her urine is treated with PCN  A new prescription was sent to her pharmacy on file

## 2022-06-01 ENCOUNTER — CONSULT (OUTPATIENT)
Dept: GYNECOLOGY | Facility: CLINIC | Age: 72
End: 2022-06-01
Payer: COMMERCIAL

## 2022-06-01 ENCOUNTER — DOCUMENTATION (OUTPATIENT)
Dept: GYNECOLOGY | Facility: CLINIC | Age: 72
End: 2022-06-01

## 2022-06-01 VITALS
WEIGHT: 143 LBS | HEIGHT: 58 IN | SYSTOLIC BLOOD PRESSURE: 174 MMHG | DIASTOLIC BLOOD PRESSURE: 92 MMHG | BODY MASS INDEX: 30.02 KG/M2 | HEART RATE: 61 BPM

## 2022-06-01 DIAGNOSIS — N81.3 UTEROVAGINAL PROLAPSE, COMPLETE: ICD-10-CM

## 2022-06-01 DIAGNOSIS — N39.41 URGENCY INCONTINENCE: Primary | ICD-10-CM

## 2022-06-01 PROCEDURE — 99213 OFFICE O/P EST LOW 20 MIN: CPT | Performed by: OBSTETRICS & GYNECOLOGY

## 2022-06-01 NOTE — PROGRESS NOTES
Assessment/Plan:         Diagnoses and all orders for this visit:    Urgency incontinence    Uterovaginal prolapse, complete; discussed options including following versus pessary versus surgical management  The patient is opted to proceed with surgical management  I recommended consultation with Urogynecology for this procedure due to the degree of prolapse          Subjective:      Patient ID: Carolina Koo is a 66 y o  female  HPI P3,  x 3, referred to office by Bud Forman, secondary to uterine prolapse and urinary incontinence  Patient states this has been going on for several months and is progressively become worse  She is complaining of increasing vaginal pressure with a noticeable protrusion from the vaginal opening  She denies any dysuria, hematuria  She is experiencing urinary incontinence primarily with urge  She also states that takes prolonged time to completely empty her bladder  Denies any need for stenting to void or to have a bowel movement  Mild stress urinary incontinence  Denies any fecal incontinence  The following portions of the patient's history were reviewed and updated as appropriate:   She  has a past medical history of Hypertension  She   Patient Active Problem List    Diagnosis Date Noted    Hypertension 2022    Dislocation of right shoulder joint 2020     She  has a past surgical history that includes Appendectomy  Her family history is not on file  She  reports that she has never smoked  She has never used smokeless tobacco  She reports that she does not drink alcohol and does not use drugs    Current Outpatient Medications   Medication Sig Dispense Refill    amLODIPine (NORVASC) 5 mg tablet Take 5 mg by mouth daily      hydrochlorothiazide (HYDRODIURIL) 25 mg tablet Take 25 mg by mouth daily      lisinopril (ZESTRIL) 40 mg tablet Take 40 mg by mouth daily      metoprolol succinate (TOPROL-XL) 100 mg 24 hr tablet Take 100 mg by mouth daily      penicillin V potassium (VEETID) 500 mg tablet Take 1 tablet (500 mg total) by mouth every 8 (eight) hours for 7 days 21 tablet 0    potassium chloride (Klor-Con) 10 mEq tablet Take 10 mEq by mouth daily      doxazosin (CARDURA) 2 mg tablet Take 2 mg by mouth daily at bedtime      metoprolol tartrate (LOPRESSOR) 100 mg tablet Take 100 mg by mouth 2 (two) times a day      nitrofurantoin (MACROBID) 100 mg capsule Take 1 capsule (100 mg total) by mouth 2 (two) times a day for 7 days (Patient not taking: Reported on 6/1/2022) 14 capsule 0    potassium chloride SA (KLOR-CON M15) 15 MEQ tablet Take 15 mEq by mouth 2 (two) times a day       No current facility-administered medications for this visit  Current Outpatient Medications on File Prior to Visit   Medication Sig    amLODIPine (NORVASC) 5 mg tablet Take 5 mg by mouth daily    hydrochlorothiazide (HYDRODIURIL) 25 mg tablet Take 25 mg by mouth daily    lisinopril (ZESTRIL) 40 mg tablet Take 40 mg by mouth daily    metoprolol succinate (TOPROL-XL) 100 mg 24 hr tablet Take 100 mg by mouth daily    penicillin V potassium (VEETID) 500 mg tablet Take 1 tablet (500 mg total) by mouth every 8 (eight) hours for 7 days    potassium chloride (Klor-Con) 10 mEq tablet Take 10 mEq by mouth daily    doxazosin (CARDURA) 2 mg tablet Take 2 mg by mouth daily at bedtime    metoprolol tartrate (LOPRESSOR) 100 mg tablet Take 100 mg by mouth 2 (two) times a day    nitrofurantoin (MACROBID) 100 mg capsule Take 1 capsule (100 mg total) by mouth 2 (two) times a day for 7 days (Patient not taking: Reported on 6/1/2022)    potassium chloride SA (KLOR-CON M15) 15 MEQ tablet Take 15 mEq by mouth 2 (two) times a day     No current facility-administered medications on file prior to visit  She has No Known Allergies       Review of Systems   Constitutional: Negative  Gastrointestinal: Negative      Genitourinary:        See HPI         Objective:      BP (!) 174/92   Pulse 61   Ht 4' 10" (1 473 m)   Wt 64 9 kg (143 lb)   BMI 29 89 kg/m²          Physical Exam  Vitals reviewed  Cardiovascular:      Rate and Rhythm: Normal rate and regular rhythm  Pulses: Normal pulses  Heart sounds: Normal heart sounds  Pulmonary:      Effort: Pulmonary effort is normal  No respiratory distress  Breath sounds: Normal breath sounds  Abdominal:      General: There is no distension  Palpations: Abdomen is soft  There is no mass  Tenderness: There is no abdominal tenderness  There is no guarding or rebound  Hernia: No hernia is present  There is no hernia in the left inguinal area or right inguinal area  Genitourinary:     General: Normal vulva  Labia:         Right: No rash, tenderness or lesion  Left: No rash, tenderness or lesion  Comments: Complete uterovaginal prolapse  Uterus is normal in size and contour  There are no palpable adnexal masses or tenderness  Cervix appears normal   Vagina with atrophic changes  Bartholin's and Arco's glands normal   Urethral orifice normal   Lymphadenopathy:      Lower Body: No right inguinal adenopathy  No left inguinal adenopathy  Neurological:      Mental Status: She is alert

## 2022-07-05 ENCOUNTER — APPOINTMENT (OUTPATIENT)
Dept: LAB | Facility: CLINIC | Age: 72
End: 2022-07-05
Payer: COMMERCIAL

## 2022-07-05 DIAGNOSIS — R05.1 ACUTE COUGH: ICD-10-CM

## 2022-07-05 DIAGNOSIS — R11.2 NAUSEA AND VOMITING, UNSPECIFIED VOMITING TYPE: ICD-10-CM

## 2022-07-05 DIAGNOSIS — R53.83 OTHER FATIGUE: ICD-10-CM

## 2022-07-05 LAB
ALBUMIN SERPL BCP-MCNC: 4.1 G/DL (ref 3.5–5)
ALP SERPL-CCNC: 43 U/L (ref 34–104)
ALT SERPL W P-5'-P-CCNC: 16 U/L (ref 7–52)
ANION GAP SERPL CALCULATED.3IONS-SCNC: 11 MMOL/L (ref 4–13)
AST SERPL W P-5'-P-CCNC: 21 U/L (ref 13–39)
BASOPHILS # BLD AUTO: 0.01 THOUSANDS/ΜL (ref 0–0.1)
BASOPHILS NFR BLD AUTO: 0 % (ref 0–1)
BILIRUB SERPL-MCNC: 0.46 MG/DL (ref 0.2–1)
BUN SERPL-MCNC: 22 MG/DL (ref 5–25)
CALCIUM SERPL-MCNC: 10.7 MG/DL (ref 8.4–10.2)
CARDIAC TROPONIN I PNL SERPL HS: 4 NG/L (ref 8–18)
CHLORIDE SERPL-SCNC: 98 MMOL/L (ref 96–108)
CO2 SERPL-SCNC: 28 MMOL/L (ref 21–32)
CREAT SERPL-MCNC: 1.09 MG/DL (ref 0.6–1.3)
EOSINOPHIL # BLD AUTO: 0 THOUSAND/ΜL (ref 0–0.61)
EOSINOPHIL NFR BLD AUTO: 0 % (ref 0–6)
ERYTHROCYTE [DISTWIDTH] IN BLOOD BY AUTOMATED COUNT: 14.4 % (ref 11.6–15.1)
GFR SERPL CREATININE-BSD FRML MDRD: 50 ML/MIN/1.73SQ M
GLUCOSE SERPL-MCNC: 128 MG/DL (ref 65–140)
HCT VFR BLD AUTO: 41.6 % (ref 34.8–46.1)
HGB BLD-MCNC: 13.2 G/DL (ref 11.5–15.4)
IMM GRANULOCYTES # BLD AUTO: 0.04 THOUSAND/UL (ref 0–0.2)
IMM GRANULOCYTES NFR BLD AUTO: 1 % (ref 0–2)
LYMPHOCYTES # BLD AUTO: 0.93 THOUSANDS/ΜL (ref 0.6–4.47)
LYMPHOCYTES NFR BLD AUTO: 13 % (ref 14–44)
MCH RBC QN AUTO: 25.6 PG (ref 26.8–34.3)
MCHC RBC AUTO-ENTMCNC: 31.7 G/DL (ref 31.4–37.4)
MCV RBC AUTO: 81 FL (ref 82–98)
MONOCYTES # BLD AUTO: 0.13 THOUSAND/ΜL (ref 0.17–1.22)
MONOCYTES NFR BLD AUTO: 2 % (ref 4–12)
NEUTROPHILS # BLD AUTO: 5.9 THOUSANDS/ΜL (ref 1.85–7.62)
NEUTS SEG NFR BLD AUTO: 84 % (ref 43–75)
NRBC BLD AUTO-RTO: 0 /100 WBCS
PLATELET # BLD AUTO: 300 THOUSANDS/UL (ref 149–390)
PMV BLD AUTO: 10.3 FL (ref 8.9–12.7)
POTASSIUM SERPL-SCNC: 3.8 MMOL/L (ref 3.5–5.3)
PROT SERPL-MCNC: 8.1 G/DL (ref 6.4–8.4)
RBC # BLD AUTO: 5.16 MILLION/UL (ref 3.81–5.12)
SARS-COV-2 IGG SERPL QL IA: REACTIVE
SARS-COV-2 IGG+IGM SERPL QL IA: REACTIVE
SODIUM SERPL-SCNC: 137 MMOL/L (ref 135–147)
TSH SERPL DL<=0.05 MIU/L-ACNC: 1.43 UIU/ML (ref 0.45–4.5)
WBC # BLD AUTO: 7.01 THOUSAND/UL (ref 4.31–10.16)

## 2022-07-05 PROCEDURE — 84443 ASSAY THYROID STIM HORMONE: CPT

## 2022-07-05 PROCEDURE — 86769 SARS-COV-2 COVID-19 ANTIBODY: CPT

## 2022-07-05 PROCEDURE — 84439 ASSAY OF FREE THYROXINE: CPT

## 2022-07-05 PROCEDURE — 84484 ASSAY OF TROPONIN QUANT: CPT

## 2022-07-05 PROCEDURE — 85025 COMPLETE CBC W/AUTO DIFF WBC: CPT

## 2022-07-05 PROCEDURE — 80053 COMPREHEN METABOLIC PANEL: CPT

## 2022-07-05 PROCEDURE — 36415 COLL VENOUS BLD VENIPUNCTURE: CPT

## 2022-07-06 LAB — T4 FREE SERPL-MCNC: 1.15 NG/DL (ref 0.76–1.46)

## 2022-11-25 ENCOUNTER — OFFICE VISIT (OUTPATIENT)
Dept: URGENT CARE | Age: 72
End: 2022-11-25

## 2022-11-25 VITALS
HEART RATE: 88 BPM | RESPIRATION RATE: 12 BRPM | TEMPERATURE: 99 F | DIASTOLIC BLOOD PRESSURE: 71 MMHG | SYSTOLIC BLOOD PRESSURE: 144 MMHG | OXYGEN SATURATION: 96 %

## 2022-11-25 DIAGNOSIS — R39.9 UTI SYMPTOMS: Primary | ICD-10-CM

## 2022-11-25 LAB
SL AMB  POCT GLUCOSE, UA: NEGATIVE
SL AMB LEUKOCYTE ESTERASE,UA: ABNORMAL
SL AMB POCT BILIRUBIN,UA: NEGATIVE
SL AMB POCT BLOOD,UA: ABNORMAL
SL AMB POCT CLARITY,UA: ABNORMAL
SL AMB POCT COLOR,UA: YELLOW
SL AMB POCT KETONES,UA: NEGATIVE
SL AMB POCT NITRITE,UA: NEGATIVE
SL AMB POCT PH,UA: 8
SL AMB POCT SPECIFIC GRAVITY,UA: 1
SL AMB POCT URINE PROTEIN: ABNORMAL
SL AMB POCT UROBILINOGEN: 0.2

## 2022-11-25 RX ORDER — CIPROFLOXACIN 500 MG/1
500 TABLET, FILM COATED ORAL 2 TIMES DAILY
COMMUNITY
Start: 2022-11-02 | End: 2022-11-26

## 2022-11-25 RX ORDER — METRONIDAZOLE 500 MG/1
500 TABLET ORAL 2 TIMES DAILY
COMMUNITY
Start: 2022-11-02 | End: 2022-11-26

## 2022-11-25 RX ORDER — PENICILLIN V POTASSIUM 500 MG/1
500 TABLET ORAL EVERY 8 HOURS SCHEDULED
Qty: 21 TABLET | Refills: 0 | Status: SHIPPED | OUTPATIENT
Start: 2022-11-25 | End: 2022-11-28

## 2022-11-25 RX ORDER — LISINOPRIL 20 MG/1
20 TABLET ORAL 2 TIMES DAILY
COMMUNITY
Start: 2022-08-23 | End: 2022-11-26

## 2022-11-26 ENCOUNTER — HOSPITAL ENCOUNTER (INPATIENT)
Facility: HOSPITAL | Age: 72
LOS: 2 days | Discharge: HOME | End: 2022-11-28
Attending: INTERNAL MEDICINE | Admitting: INTERNAL MEDICINE

## 2022-11-26 ENCOUNTER — APPOINTMENT (EMERGENCY)
Dept: CT IMAGING | Facility: HOSPITAL | Age: 72
End: 2022-11-26

## 2022-11-26 ENCOUNTER — HOSPITAL ENCOUNTER (EMERGENCY)
Facility: HOSPITAL | Age: 72
End: 2022-11-26
Attending: EMERGENCY MEDICINE

## 2022-11-26 VITALS
SYSTOLIC BLOOD PRESSURE: 149 MMHG | OXYGEN SATURATION: 93 % | RESPIRATION RATE: 16 BRPM | TEMPERATURE: 98 F | HEART RATE: 71 BPM | DIASTOLIC BLOOD PRESSURE: 68 MMHG

## 2022-11-26 DIAGNOSIS — R10.9 FLANK PAIN: Primary | ICD-10-CM

## 2022-11-26 DIAGNOSIS — N13.30 HYDROURETERONEPHROSIS: Primary | ICD-10-CM

## 2022-11-26 DIAGNOSIS — N20.0 KIDNEY STONE: ICD-10-CM

## 2022-11-26 PROBLEM — E87.6 HYPOKALEMIA: Status: ACTIVE | Noted: 2022-11-26

## 2022-11-26 PROBLEM — N39.0 UTI (URINARY TRACT INFECTION): Status: ACTIVE | Noted: 2022-11-26

## 2022-11-26 LAB
ALBUMIN SERPL BCP-MCNC: 3.9 G/DL (ref 3.5–5)
ALP SERPL-CCNC: 42 U/L (ref 34–104)
ALT SERPL W P-5'-P-CCNC: 13 U/L (ref 7–52)
ANION GAP SERPL CALCULATED.3IONS-SCNC: 9 MMOL/L (ref 4–13)
AST SERPL W P-5'-P-CCNC: 17 U/L (ref 13–39)
BACTERIA UR CULT: NORMAL
BACTERIA UR QL AUTO: ABNORMAL /HPF
BASOPHILS # BLD AUTO: 0.03 THOUSANDS/ÂΜL (ref 0–0.1)
BASOPHILS NFR BLD AUTO: 0 % (ref 0–1)
BILIRUB SERPL-MCNC: 0.5 MG/DL (ref 0.2–1)
BILIRUB UR QL STRIP: NEGATIVE
BUN SERPL-MCNC: 17 MG/DL (ref 5–25)
CALCIUM SERPL-MCNC: 9.4 MG/DL (ref 8.4–10.2)
CHLORIDE SERPL-SCNC: 98 MMOL/L (ref 96–108)
CLARITY UR: CLEAR
CO2 SERPL-SCNC: 27 MMOL/L (ref 21–32)
COLOR UR: COLORLESS
CREAT SERPL-MCNC: 0.99 MG/DL (ref 0.6–1.3)
EOSINOPHIL # BLD AUTO: 0.3 THOUSAND/ÂΜL (ref 0–0.61)
EOSINOPHIL NFR BLD AUTO: 4 % (ref 0–6)
ERYTHROCYTE [DISTWIDTH] IN BLOOD BY AUTOMATED COUNT: 15.9 % (ref 11.6–15.1)
GFR SERPL CREATININE-BSD FRML MDRD: 56 ML/MIN/1.73SQ M
GLUCOSE SERPL-MCNC: 105 MG/DL (ref 65–140)
GLUCOSE UR STRIP-MCNC: NEGATIVE MG/DL
HCT VFR BLD AUTO: 37.6 % (ref 34.8–46.1)
HGB BLD-MCNC: 12 G/DL (ref 11.5–15.4)
HGB UR QL STRIP.AUTO: ABNORMAL
IMM GRANULOCYTES # BLD AUTO: 0.02 THOUSAND/UL (ref 0–0.2)
IMM GRANULOCYTES NFR BLD AUTO: 0 % (ref 0–2)
KETONES UR STRIP-MCNC: NEGATIVE MG/DL
LEUKOCYTE ESTERASE UR QL STRIP: ABNORMAL
LIPASE SERPL-CCNC: 23 U/L (ref 11–82)
LYMPHOCYTES # BLD AUTO: 2.26 THOUSANDS/ÂΜL (ref 0.6–4.47)
LYMPHOCYTES NFR BLD AUTO: 27 % (ref 14–44)
MCH RBC QN AUTO: 25.4 PG (ref 26.8–34.3)
MCHC RBC AUTO-ENTMCNC: 31.9 G/DL (ref 31.4–37.4)
MCV RBC AUTO: 80 FL (ref 82–98)
MONOCYTES # BLD AUTO: 0.84 THOUSAND/ÂΜL (ref 0.17–1.22)
MONOCYTES NFR BLD AUTO: 10 % (ref 4–12)
MUCOUS THREADS UR QL AUTO: ABNORMAL
NEUTROPHILS # BLD AUTO: 5.08 THOUSANDS/ÂΜL (ref 1.85–7.62)
NEUTS SEG NFR BLD AUTO: 59 % (ref 43–75)
NITRITE UR QL STRIP: NEGATIVE
NON-SQ EPI CELLS URNS QL MICRO: ABNORMAL /HPF
NRBC BLD AUTO-RTO: 0 /100 WBCS
PH UR STRIP.AUTO: 7 [PH]
PLATELET # BLD AUTO: 269 THOUSANDS/UL (ref 149–390)
PMV BLD AUTO: 10 FL (ref 8.9–12.7)
POTASSIUM SERPL-SCNC: 3.4 MMOL/L (ref 3.5–5.3)
PROT SERPL-MCNC: 7.6 G/DL (ref 6.4–8.4)
PROT UR STRIP-MCNC: ABNORMAL MG/DL
RBC # BLD AUTO: 4.73 MILLION/UL (ref 3.81–5.12)
RBC #/AREA URNS AUTO: ABNORMAL /HPF
SODIUM SERPL-SCNC: 134 MMOL/L (ref 135–147)
SP GR UR STRIP.AUTO: 1.01 (ref 1–1.03)
UROBILINOGEN UR STRIP-ACNC: <2 MG/DL
WBC # BLD AUTO: 8.53 THOUSAND/UL (ref 4.31–10.16)
WBC #/AREA URNS AUTO: ABNORMAL /HPF

## 2022-11-26 RX ORDER — CALCIUM CARBONATE 200(500)MG
1000 TABLET,CHEWABLE ORAL DAILY PRN
Status: DISCONTINUED | OUTPATIENT
Start: 2022-11-26 | End: 2022-11-28 | Stop reason: HOSPADM

## 2022-11-26 RX ORDER — ENOXAPARIN SODIUM 100 MG/ML
40 INJECTION SUBCUTANEOUS DAILY
Status: DISCONTINUED | OUTPATIENT
Start: 2022-11-27 | End: 2022-11-28 | Stop reason: HOSPADM

## 2022-11-26 RX ORDER — SENNOSIDES 8.6 MG
2 TABLET ORAL DAILY
Status: DISCONTINUED | OUTPATIENT
Start: 2022-11-27 | End: 2022-11-28 | Stop reason: HOSPADM

## 2022-11-26 RX ORDER — ONDANSETRON 2 MG/ML
4 INJECTION INTRAMUSCULAR; INTRAVENOUS EVERY 6 HOURS PRN
Status: DISCONTINUED | OUTPATIENT
Start: 2022-11-26 | End: 2022-11-28 | Stop reason: HOSPADM

## 2022-11-26 RX ORDER — ACETAMINOPHEN 325 MG/1
650 TABLET ORAL EVERY 6 HOURS PRN
Status: DISCONTINUED | OUTPATIENT
Start: 2022-11-26 | End: 2022-11-28 | Stop reason: HOSPADM

## 2022-11-26 RX ORDER — CEFTRIAXONE 1 G/50ML
1000 INJECTION, SOLUTION INTRAVENOUS ONCE
Status: COMPLETED | OUTPATIENT
Start: 2022-11-26 | End: 2022-11-26

## 2022-11-26 RX ORDER — METOPROLOL TARTRATE 50 MG/1
50 TABLET, FILM COATED ORAL ONCE
Status: COMPLETED | OUTPATIENT
Start: 2022-11-26 | End: 2022-11-26

## 2022-11-26 RX ORDER — METOPROLOL TARTRATE 50 MG/1
50 TABLET, FILM COATED ORAL
Status: DISCONTINUED | OUTPATIENT
Start: 2022-11-26 | End: 2022-11-28 | Stop reason: HOSPADM

## 2022-11-26 RX ORDER — AMLODIPINE BESYLATE 5 MG/1
5 TABLET ORAL ONCE
Status: COMPLETED | OUTPATIENT
Start: 2022-11-26 | End: 2022-11-26

## 2022-11-26 RX ORDER — AMLODIPINE BESYLATE 5 MG/1
5 TABLET ORAL
Status: DISCONTINUED | OUTPATIENT
Start: 2022-11-26 | End: 2022-11-28 | Stop reason: HOSPADM

## 2022-11-26 RX ORDER — OXYCODONE HYDROCHLORIDE 5 MG/1
5 TABLET ORAL EVERY 4 HOURS PRN
Status: DISCONTINUED | OUTPATIENT
Start: 2022-11-26 | End: 2022-11-28 | Stop reason: HOSPADM

## 2022-11-26 RX ORDER — POTASSIUM CHLORIDE 20 MEQ/1
40 TABLET, EXTENDED RELEASE ORAL ONCE
Status: DISCONTINUED | OUTPATIENT
Start: 2022-11-26 | End: 2022-11-28 | Stop reason: HOSPADM

## 2022-11-26 RX ORDER — SODIUM CHLORIDE, SODIUM GLUCONATE, SODIUM ACETATE, POTASSIUM CHLORIDE, MAGNESIUM CHLORIDE, SODIUM PHOSPHATE, DIBASIC, AND POTASSIUM PHOSPHATE .53; .5; .37; .037; .03; .012; .00082 G/100ML; G/100ML; G/100ML; G/100ML; G/100ML; G/100ML; G/100ML
100 INJECTION, SOLUTION INTRAVENOUS CONTINUOUS
Status: DISCONTINUED | OUTPATIENT
Start: 2022-11-26 | End: 2022-11-27

## 2022-11-26 RX ORDER — POTASSIUM CHLORIDE 750 MG/1
10 TABLET, EXTENDED RELEASE ORAL
Status: DISCONTINUED | OUTPATIENT
Start: 2022-11-26 | End: 2022-11-28 | Stop reason: HOSPADM

## 2022-11-26 RX ORDER — HYDROMORPHONE HCL/PF 1 MG/ML
0.5 SYRINGE (ML) INJECTION EVERY 4 HOURS PRN
Status: DISCONTINUED | OUTPATIENT
Start: 2022-11-26 | End: 2022-11-28 | Stop reason: HOSPADM

## 2022-11-26 RX ORDER — OXYCODONE HYDROCHLORIDE 5 MG/1
2.5 TABLET ORAL EVERY 4 HOURS PRN
Status: DISCONTINUED | OUTPATIENT
Start: 2022-11-26 | End: 2022-11-28 | Stop reason: HOSPADM

## 2022-11-26 RX ORDER — DOCUSATE SODIUM 100 MG/1
100 CAPSULE, LIQUID FILLED ORAL 2 TIMES DAILY
Status: DISCONTINUED | OUTPATIENT
Start: 2022-11-26 | End: 2022-11-28 | Stop reason: HOSPADM

## 2022-11-26 RX ORDER — METOPROLOL TARTRATE 50 MG/1
100 TABLET, FILM COATED ORAL ONCE
Status: DISCONTINUED | OUTPATIENT
Start: 2022-11-26 | End: 2022-11-26

## 2022-11-26 RX ORDER — SODIUM CHLORIDE 9 MG/ML
100 INJECTION, SOLUTION INTRAVENOUS CONTINUOUS
Status: DISCONTINUED | OUTPATIENT
Start: 2022-11-26 | End: 2022-11-26 | Stop reason: HOSPADM

## 2022-11-26 RX ORDER — POTASSIUM CHLORIDE 750 MG/1
10 TABLET, EXTENDED RELEASE ORAL ONCE
Status: COMPLETED | OUTPATIENT
Start: 2022-11-26 | End: 2022-11-26

## 2022-11-26 RX ADMIN — MORPHINE SULFATE 2 MG: 2 INJECTION, SOLUTION INTRAMUSCULAR; INTRAVENOUS at 15:02

## 2022-11-26 RX ADMIN — CEFTRIAXONE 1000 MG: 1 INJECTION, SOLUTION INTRAVENOUS at 15:04

## 2022-11-26 RX ADMIN — MORPHINE SULFATE 2 MG: 2 INJECTION, SOLUTION INTRAMUSCULAR; INTRAVENOUS at 19:05

## 2022-11-26 RX ADMIN — IOHEXOL 100 ML: 350 INJECTION, SOLUTION INTRAVENOUS at 16:31

## 2022-11-26 RX ADMIN — METOPROLOL TARTRATE 50 MG: 50 TABLET, FILM COATED ORAL at 20:06

## 2022-11-26 RX ADMIN — SODIUM CHLORIDE 100 ML/HR: 0.9 INJECTION, SOLUTION INTRAVENOUS at 19:36

## 2022-11-26 RX ADMIN — SODIUM CHLORIDE, SODIUM GLUCONATE, SODIUM ACETATE, POTASSIUM CHLORIDE, MAGNESIUM CHLORIDE, SODIUM PHOSPHATE, DIBASIC, AND POTASSIUM PHOSPHATE 100 ML/HR: .53; .5; .37; .037; .03; .012; .00082 INJECTION, SOLUTION INTRAVENOUS at 23:58

## 2022-11-26 RX ADMIN — AMLODIPINE BESYLATE 5 MG: 5 TABLET ORAL at 20:06

## 2022-11-26 RX ADMIN — POTASSIUM CHLORIDE 10 MEQ: 750 TABLET, EXTENDED RELEASE ORAL at 20:06

## 2022-11-26 RX ADMIN — SODIUM CHLORIDE 1000 ML: 0.9 INJECTION, SOLUTION INTRAVENOUS at 14:48

## 2022-11-26 NOTE — ED PROVIDER NOTES
History  Chief Complaint   Patient presents with   • Urinary Frequency     Urinary frequency and pain since thanksgiving  Pt developed right side flank pain yesterday and was given penicillin at urgent care  Pt states pain has gotten increasingly worse  HPI     33-year-old female presents emergency department for persistent right flank pain, urinary frequency  Started on Thanksgiving, patient with urgent care yesterday was started on penicillin VK  She has not improved and continues with worsening pain and frequency  Denies any fever  Denies any chest pain or shortness of breath  Prior to Admission Medications   Prescriptions Last Dose Informant Patient Reported? Taking? amLODIPine (NORVASC) 5 mg tablet 11/25/2022  Yes Yes   Sig: Take 5 mg by mouth daily at bedtime   hydrochlorothiazide (HYDRODIURIL) 25 mg tablet 11/26/2022  Yes Yes   Sig: Take 25 mg by mouth daily   metoprolol tartrate (LOPRESSOR) 100 mg tablet 11/25/2022  Yes Yes   Sig: Take 50 mg by mouth daily at bedtime   penicillin V potassium (VEETID) 500 mg tablet Not Taking  No No   Sig: Take 1 tablet (500 mg total) by mouth every 8 (eight) hours for 7 days   Patient not taking: Reported on 11/26/2022   potassium chloride (Klor-Con) 10 mEq tablet 11/25/2022  Yes Yes   Sig: Take 10 mEq by mouth daily at bedtime      Facility-Administered Medications: None       Past Medical History:   Diagnosis Date   • Hypertension        Past Surgical History:   Procedure Laterality Date   • APPENDECTOMY         Family History   Problem Relation Age of Onset   • Hypertension Mother      I have reviewed and agree with the history as documented      E-Cigarette/Vaping   • E-Cigarette Use Never User      E-Cigarette/Vaping Substances     Social History     Tobacco Use   • Smoking status: Never   • Smokeless tobacco: Never   Vaping Use   • Vaping Use: Never used   Substance Use Topics   • Alcohol use: No   • Drug use: No       Review of Systems   Genitourinary: Positive for dysuria and flank pain  All other systems reviewed and are negative  Physical Exam  Physical Exam  Vitals and nursing note reviewed  Constitutional:       General: She is not in acute distress  Appearance: She is well-developed and well-nourished  HENT:      Head: Normocephalic and atraumatic  Eyes:      Extraocular Movements: EOM normal       Pupils: Pupils are equal, round, and reactive to light  Cardiovascular:      Rate and Rhythm: Normal rate and regular rhythm  Heart sounds: Normal heart sounds  No murmur heard  Pulmonary:      Effort: Pulmonary effort is normal  No respiratory distress  Breath sounds: Normal breath sounds  No wheezing or rales  Abdominal:      General: Bowel sounds are normal  There is no distension  Palpations: Abdomen is soft  Tenderness: There is abdominal tenderness  There is right CVA tenderness  There is no guarding or rebound  Comments: Right CVA tenderness, right lower quadrant tenderness   Musculoskeletal:         General: No deformity or edema  Normal range of motion  Cervical back: Normal range of motion and neck supple  Lymphadenopathy:      Cervical: No cervical adenopathy  Skin:     Capillary Refill: Capillary refill takes less than 2 seconds  Findings: No erythema or rash  Neurological:      Mental Status: She is alert and oriented to person, place, and time  Cranial Nerves: No cranial nerve deficit  Motor: No abnormal muscle tone        Coordination: Coordination normal    Psychiatric:         Mood and Affect: Mood and affect normal          Behavior: Behavior normal          Vital Signs  ED Triage Vitals   Temperature Pulse Respirations Blood Pressure SpO2   11/26/22 1433 11/26/22 1433 11/26/22 1433 11/26/22 1433 11/26/22 1433   98 °F (36 7 °C) 69 18 (!) 186/88 97 %      Temp src Heart Rate Source Patient Position - Orthostatic VS BP Location FiO2 (%)   -- 11/26/22 1500 11/26/22 1500 11/26/22 1500 --    Monitor Sitting Right arm       Pain Score       11/26/22 1433       8           Vitals:    11/26/22 1905 11/26/22 1930 11/26/22 2000 11/26/22 2100   BP: (!) 173/77 150/68 158/72 149/68   Pulse: 79 76 74 71   Patient Position - Orthostatic VS: Sitting            Visual Acuity      ED Medications  Medications   sodium chloride 0 9 % bolus 1,000 mL (0 mL Intravenous Stopped 11/26/22 1700)   cefTRIAXone (ROCEPHIN) IVPB (premix in dextrose) 1,000 mg 50 mL (0 mg Intravenous Stopped 11/26/22 1534)   morphine injection 2 mg (2 mg Intravenous Given 11/26/22 1502)   iohexol (OMNIPAQUE) 350 MG/ML injection (SINGLE-DOSE) 100 mL (100 mL Intravenous Given 11/26/22 1631)   morphine injection 2 mg (2 mg Intravenous Given 11/26/22 1905)   amLODIPine (NORVASC) tablet 5 mg (5 mg Oral Given 11/26/22 2006)   potassium chloride (K-DUR,KLOR-CON) CR tablet 10 mEq (10 mEq Oral Given 11/26/22 2006)   metoprolol tartrate (LOPRESSOR) tablet 50 mg (50 mg Oral Given 11/26/22 2006)       Diagnostic Studies  Results Reviewed     Procedure Component Value Units Date/Time    Lipase [[id number]]  (Normal) Collected: 11/26/22 1447    Lab Status: Final result Specimen: Blood from Arm, Right Updated: 11/26/22 1513     Lipase 23 u/L     Comprehensive metabolic panel [[id number]]  (Abnormal) Collected: 11/26/22 1447    Lab Status: Final result Specimen: Blood from Arm, Right Updated: 11/26/22 1513     Sodium 134 mmol/L      Potassium 3 4 mmol/L      Chloride 98 mmol/L      CO2 27 mmol/L      ANION GAP 9 mmol/L      BUN 17 mg/dL      Creatinine 0 99 mg/dL      Glucose 105 mg/dL      Calcium 9 4 mg/dL      AST 17 U/L      ALT 13 U/L      Alkaline Phosphatase 42 U/L      Total Protein 7 6 g/dL      Albumin 3 9 g/dL      Total Bilirubin 0 50 mg/dL      eGFR 56 ml/min/1 73sq m     Narrative:      [place] guidelines for Chronic Kidney Disease (CKD):   •  Stage 1 with normal or high GFR (GFR > 90 mL/min/1 73 square meters)  • Stage 2 Mild CKD (GFR = 60-89 mL/min/1 73 square meters)  •  Stage 3A Moderate CKD (GFR = 45-59 mL/min/1 73 square meters)  •  Stage 3B Moderate CKD (GFR = 30-44 mL/min/1 73 square meters)  •  Stage 4 Severe CKD (GFR = 15-29 mL/min/1 73 square meters)  •  Stage 5 End Stage CKD (GFR <15 mL/min/1 73 square meters)  Note: GFR calculation is accurate only with a steady state creatinine    Urine Microscopic [[id number]]  (Abnormal) Collected: 11/26/22 1447    Lab Status: Final result Specimen: Urine, Clean Catch Updated: 11/26/22 1507     RBC, UA Innumerable /hpf      WBC, UA 10-20 /hpf      Epithelial Cells Occasional /hpf      Bacteria, UA Occasional /hpf      MUCUS THREADS Occasional    Urine culture [[id number]] Collected: 11/26/22 1447    Lab Status:  In process Specimen: Urine, Clean Catch Updated: 11/26/22 1507    UA w Reflex to Microscopic w Reflex to Culture [[id number]]  (Abnormal) Collected: 11/26/22 1447    Lab Status: Final result Specimen: Urine, Clean Catch Updated: 11/26/22 1458     Color, UA Colorless     Clarity, UA Clear     Specific Gravity, UA 1 006     pH, UA 7 0     Leukocytes, UA Small     Nitrite, UA Negative     Protein, UA 50 (1+) mg/dl      Glucose, UA Negative mg/dl      Ketones, UA Negative mg/dl      Urobilinogen, UA <2 0 mg/dl      Bilirubin, UA Negative     Occult Blood, UA Moderate    CBC and differential [[id number]]  (Abnormal) Collected: 11/26/22 1447    Lab Status: Final result Specimen: Blood from Arm, Right Updated: 11/26/22 1456     WBC 8 53 Thousand/uL      RBC 4 73 Million/uL      Hemoglobin 12 0 g/dL      Hematocrit 37 6 %      MCV 80 fL      MCH 25 4 pg      MCHC 31 9 g/dL      RDW 15 9 %      MPV 10 0 fL      Platelets 212 Thousands/uL      nRBC 0 /100 WBCs      Neutrophils Relative 59 %      Immat GRANS % 0 %      Lymphocytes Relative 27 %      Monocytes Relative 10 %      Eosinophils Relative 4 %      Basophils Relative 0 %      Neutrophils Absolute 5 08 Thousands/µL Immature Grans Absolute 0 02 Thousand/uL      Lymphocytes Absolute 2 26 Thousands/µL      Monocytes Absolute 0 84 Thousand/µL      Eosinophils Absolute 0 30 Thousand/µL      Basophils Absolute 0 03 Thousands/µL                  CT abdomen pelvis with contrast   Final Result by Lyman Crigler, MD (11/26 1834)      1  Right-sided hydroureteronephrosis secondary to a 4 mm stone in the right ureterovesical junction there is extravasation of excreted contrast on delayed imaging consistent with calyceal rupture  Multiple small nonobstructing right renal calculi also    seen  2   Bladder wall thickening with mild perivesical stranding suspicious for cystitis  3   Indeterminant left renal lesion, not present previously, possibly a complex cyst though solid enhancing lesion is not excluded  A right-sided lesion is also indeterminate though may have been present previously  Further evaluation with ultrasound    is recommended  4   Biliary ductal dilatation likely related to cholecystectomy though slightly out of proportion  No obstruction is seen though correlation with bilirubin/liver enzymes recommended  The study was marked in [facility] for immediate notification  Workstation performed: [id number]                    Procedures  Procedures         ED Course                               SBIRT 22yo+    Flowsheet Row Most Recent Value   SBIRT (23 yo +)    In order to provide better care to our patients, we are screening all of our patients for alcohol and drug use  Would it be okay to ask you these screening questions? Unable to answer at this time Filed at: 11/26/2022 1514                    [facility]  Number of Diagnoses or Management Options  Flank pain: new and requires workup  Kidney stone: new and requires workup  Diagnosis management comments: Right-sided flank pain    Was treated as an outpatient with penicillin VK after visiting in urgent care for right flank pain and urinary frequency yesterday  Denies any fevers or chills  Reports history of kidney stones  Laboratory studies reveal no leukocytosis  Kidney function unremarkable  CT scan concerning for right-sided hydro uro nephrosis secondary 4 mm stone with findings consistent with caliceal rupture  Patient is hemodynamically stable  She was given IV antibiotics  She was given morphine for pain  Appreciate urology recommendations - transfer [facility], IV fluids, NPO after midnight, likely OR tomorrow after urologic consultation       Amount and/or Complexity of Data Reviewed  Clinical lab tests: ordered and reviewed  Tests in the radiology section of CPT®: ordered and reviewed  Tests in the medicine section of CPT®: ordered and reviewed  Discuss the patient with other providers: yes    Risk of Complications, Morbidity, and/or Mortality  Presenting problems: high  Diagnostic procedures: high  Management options: high    Patient Progress  Patient progress: improved      Disposition  Final diagnoses:   Flank pain   Kidney stone     Time reflects when diagnosis was documented in both MDM as applicable and the Disposition within this note     Time User Action Codes Description Comment    11/26/2022  6:45 PM Geovany Mcarthur Add [R10 9] Flank pain     11/26/2022  6:45 PM Geovany Mcarthur Add [N20 0] Kidney stone       ED Disposition     ED Disposition   Transfer to Another Facility-In Network    Condition   --    Date/Time   Sat Nov 26, 2022  7:32 PM    Comment   Amy Escoto should be transferred out to B             MD Documentation    Michelle July Most Recent Value   Patient Condition The patient has been stabilized such that within reasonable medical probability, no material deterioration of the patient condition or the condition of the unborn child(juliann) is likely to result from the transfer   Reason for Transfer Level of Care needed not available at this facility  [Urologic intervention]   Benefits of Transfer Specialized equipment and/or services available at the receiving facility (Include comment)________________________  [Urologic intervention]   Risks of Transfer Potential for delay in receiving treatment, Potential deterioration of medical condition, Loss of IV, Possible worsening of condition or death during transfer   Accepting Physician Dr Mike Carreno Name, Siobhan Gray   Provider Certification General risk, such as traffic hazards, adverse weather conditions, rough terrain or turbulence, possible failure of equipment (including vehicle or aircraft), or consequences of actions of persons outside the control of the transport personnel, Risk of worsening condition, Unanticipated needs of medical equipment and personnel during transport, The possibility of a transport vehicle being unavailable, Consent was not obtained as patient is committed to psychiatric facility and transfer is mandated      RN Documentation    [address], [address]  [facility]      Follow-up Information    None         Discharge Medication List as of 11/26/2022  9:23 PM      CONTINUE these medications which have NOT CHANGED    Details   amLODIPine (NORVASC) 5 mg tablet Take 5 mg by mouth daily at bedtime, Starting Tue 4/26/2022, Historical Med      hydrochlorothiazide (HYDRODIURIL) 25 mg tablet Take 25 mg by mouth daily, Until Discontinued, Historical Med      metoprolol tartrate (LOPRESSOR) 100 mg tablet Take 50 mg by mouth daily at bedtime, Historical Med      penicillin V potassium (VEETID) 500 mg tablet Take 1 tablet (500 mg total) by mouth every 8 (eight) hours for 7 days, Starting Fri 11/25/2022, Until Fri 12/2/2022, Normal      potassium chloride (Klor-Con) 10 mEq tablet Take 10 mEq by mouth daily at bedtime, Starting Fri 5/20/2022, Historical Med             No discharge procedures on file      PDMP Review     None          ED Provider  Electronically Signed by           Albin Espana MD  11/27/22 0442

## 2022-11-26 NOTE — PATIENT INSTRUCTIONS
Please take antibiotics every 8 hours for the next 7 days  If symptoms persist, please follow-up with your primary care provider  We will contact you if we need to change her antibiotics once urine culture results

## 2022-11-26 NOTE — PROGRESS NOTES
[facility]        NAME: Juan Carlos Pat is a 74 y o  female  : 1950    MRN: [id number]  DATE: 2022  TIME: 7:11 PM    Assessment and Plan   UTI symptoms [R39 9]  1  UTI symptoms  POCT urine dip    Urine culture    penicillin V potassium (VEETID) 500 mg tablet          Dip positive for large leukocytes and large blood  Will initiate antibiotic therapy at this time  Awaiting culture results  Discussed with patient taking penicillin 3 times daily for the next 7 days  All questions and concerns were addressed during this visit  Patient Instructions     Antibiotics as discussed  Follow up with PCP in 3-5 days  Proceed to  ER if symptoms worsen  Chief Complaint     Chief Complaint   Patient presents with   • Possible UTI     Increased frequency and burning sensation that started yesterday         History of Present Illness       Patient presenting for evaluation of UTI symptoms that began 1 day ago  Patient states that she has been having frequency, burning with urination and right-sided flank pain  She denies any fevers or chills at this time  She denies any hematuria  Patient states that she was most recently treated for a urinary tract infection in May  Review of Systems   Review of Systems   Constitutional: Negative for chills and fever  Gastrointestinal: Negative for abdominal pain, diarrhea, nausea and vomiting  Genitourinary: Positive for dysuria, flank pain, frequency and urgency  Negative for difficulty urinating, hematuria and vaginal bleeding  All other systems reviewed and are negative          Current Medications       Current Outpatient Medications:   •  penicillin V potassium (VEETID) 500 mg tablet, Take 1 tablet (500 mg total) by mouth every 8 (eight) hours for 7 days, Disp: 21 tablet, Rfl: 0  •  amLODIPine (NORVASC) 5 mg tablet, Take 5 mg by mouth daily, Disp: , Rfl:   •  ciprofloxacin (CIPRO) 500 mg tablet, Take 500 mg by mouth 2 (two) times a day, Disp: , Rfl:   •  doxazosin (CARDURA) 2 mg tablet, Take 2 mg by mouth daily at bedtime, Disp: , Rfl:   •  hydrochlorothiazide (HYDRODIURIL) 25 mg tablet, Take 25 mg by mouth daily, Disp: , Rfl:   •  lisinopril (ZESTRIL) 20 mg tablet, Take 20 mg by mouth 2 (two) times a day, Disp: , Rfl:   •  lisinopril (ZESTRIL) 40 mg tablet, Take 40 mg by mouth daily, Disp: , Rfl:   •  metoprolol succinate (TOPROL-XL) 100 mg 24 hr tablet, Take 100 mg by mouth daily, Disp: , Rfl:   •  metoprolol tartrate (LOPRESSOR) 100 mg tablet, Take 100 mg by mouth 2 (two) times a day, Disp: , Rfl:   •  metroNIDAZOLE (FLAGYL) 500 mg tablet, Take 500 mg by mouth 2 (two) times a day, Disp: , Rfl:   •  potassium chloride (Klor-Con) 10 mEq tablet, Take 10 mEq by mouth daily, Disp: , Rfl:   •  potassium chloride SA (KLOR-CON M15) 15 MEQ tablet, Take 15 mEq by mouth 2 (two) times a day, Disp: , Rfl:     Current Allergies     Allergies as of 11/25/2022   • (No Known Allergies)            The following portions of the patient's history were reviewed and updated as appropriate: allergies, current medications, past family history, past medical history, past social history, past surgical history and problem list      Past Medical History:   Diagnosis Date   • Hypertension        Past Surgical History:   Procedure Laterality Date   • APPENDECTOMY         History reviewed  No pertinent family history  Medications have been verified  Objective   /71 (BP Location: Left arm, Patient Position: Sitting, Cuff Size: Large)   Pulse 88   Temp 99 °F (37 2 °C) (Temporal)   Resp 12   SpO2 96%        Physical Exam     Physical Exam  Vitals and nursing note reviewed  Constitutional:       General: She is not in acute distress  Appearance: Normal appearance  She is normal weight  She is not ill-appearing, toxic-appearing or diaphoretic  HENT:      Head: Normocephalic and atraumatic  Eyes:      General:         Left eye: No discharge  Cardiovascular:      Rate and Rhythm: Normal rate and regular rhythm  Pulses: Normal pulses  Heart sounds: Normal heart sounds  No murmur heard  No friction rub  Pulmonary:      Effort: Pulmonary effort is normal       Breath sounds: Normal breath sounds  No wheezing or rales  Chest:      Chest wall: No tenderness  Abdominal:      General: Abdomen is flat  Bowel sounds are normal       Palpations: Abdomen is soft  Tenderness: There is no abdominal tenderness  There is right CVA tenderness  There is no left CVA tenderness, guarding or rebound  Skin:     General: Skin is warm and dry  Neurological:      Mental Status: She is alert     Psychiatric:         Mood and Affect: Mood normal          Behavior: Behavior normal

## 2022-11-27 ENCOUNTER — ANESTHESIA EVENT (INPATIENT)
Dept: PERIOP | Facility: HOSPITAL | Age: 72
End: 2022-11-27

## 2022-11-27 ENCOUNTER — ANESTHESIA (INPATIENT)
Dept: PERIOP | Facility: HOSPITAL | Age: 72
End: 2022-11-27

## 2022-11-27 ENCOUNTER — APPOINTMENT (OUTPATIENT)
Dept: RADIOLOGY | Facility: HOSPITAL | Age: 72
End: 2022-11-27

## 2022-11-27 ENCOUNTER — TELEPHONE (OUTPATIENT)
Dept: OTHER | Facility: HOSPITAL | Age: 72
End: 2022-11-27

## 2022-11-27 DIAGNOSIS — N20.0 NEPHROLITHIASIS: Primary | ICD-10-CM

## 2022-11-27 LAB
ANION GAP SERPL CALCULATED.3IONS-SCNC: 7 MMOL/L (ref 4–13)
BUN SERPL-MCNC: 9 MG/DL (ref 5–25)
CALCIUM SERPL-MCNC: 9 MG/DL (ref 8.3–10.1)
CHLORIDE SERPL-SCNC: 109 MMOL/L (ref 96–108)
CO2 SERPL-SCNC: 25 MMOL/L (ref 21–32)
CREAT SERPL-MCNC: 0.68 MG/DL (ref 0.6–1.3)
ERYTHROCYTE [DISTWIDTH] IN BLOOD BY AUTOMATED COUNT: 15.9 % (ref 11.6–15.1)
GFR SERPL CREATININE-BSD FRML MDRD: 86 ML/MIN/1.73SQ M
GLUCOSE SERPL-MCNC: 100 MG/DL (ref 65–140)
HCT VFR BLD AUTO: 34 % (ref 34.8–46.1)
HGB BLD-MCNC: 10.8 G/DL (ref 11.5–15.4)
MCH RBC QN AUTO: 25.5 PG (ref 26.8–34.3)
MCHC RBC AUTO-ENTMCNC: 31.8 G/DL (ref 31.4–37.4)
MCV RBC AUTO: 80 FL (ref 82–98)
PLATELET # BLD AUTO: 246 THOUSANDS/UL (ref 149–390)
PMV BLD AUTO: 10.9 FL (ref 8.9–12.7)
POTASSIUM SERPL-SCNC: 3.1 MMOL/L (ref 3.5–5.3)
RBC # BLD AUTO: 4.23 MILLION/UL (ref 3.81–5.12)
SODIUM SERPL-SCNC: 141 MMOL/L (ref 135–147)
WBC # BLD AUTO: 6.97 THOUSAND/UL (ref 4.31–10.16)

## 2022-11-27 PROCEDURE — BT1D1ZZ FLUOROSCOPY OF RIGHT KIDNEY, URETER AND BLADDER USING LOW OSMOLAR CONTRAST: ICD-10-PCS | Performed by: RADIOLOGY

## 2022-11-27 PROCEDURE — 0T768DZ DILATION OF RIGHT URETER WITH INTRALUMINAL DEVICE, VIA NATURAL OR ARTIFICIAL OPENING ENDOSCOPIC: ICD-10-PCS | Performed by: STUDENT IN AN ORGANIZED HEALTH CARE EDUCATION/TRAINING PROGRAM

## 2022-11-27 DEVICE — INLAY OPTIMA URETERAL STENT W/O GUIDEWIRE
Type: IMPLANTABLE DEVICE | Status: FUNCTIONAL
Brand: BARD® INLAY OPTIMA® URETERAL STENT

## 2022-11-27 RX ORDER — ONDANSETRON 2 MG/ML
INJECTION INTRAMUSCULAR; INTRAVENOUS AS NEEDED
Status: DISCONTINUED | OUTPATIENT
Start: 2022-11-27 | End: 2022-11-27

## 2022-11-27 RX ORDER — PROPOFOL 10 MG/ML
INJECTION, EMULSION INTRAVENOUS AS NEEDED
Status: DISCONTINUED | OUTPATIENT
Start: 2022-11-27 | End: 2022-11-27

## 2022-11-27 RX ORDER — HYDROMORPHONE HCL/PF 1 MG/ML
0.4 SYRINGE (ML) INJECTION
Status: DISCONTINUED | OUTPATIENT
Start: 2022-11-27 | End: 2022-11-27 | Stop reason: HOSPADM

## 2022-11-27 RX ORDER — ONDANSETRON 2 MG/ML
4 INJECTION INTRAMUSCULAR; INTRAVENOUS ONCE AS NEEDED
Status: DISCONTINUED | OUTPATIENT
Start: 2022-11-27 | End: 2022-11-27 | Stop reason: HOSPADM

## 2022-11-27 RX ORDER — POTASSIUM CHLORIDE 14.9 MG/ML
40 INJECTION INTRAVENOUS
Status: COMPLETED | OUTPATIENT
Start: 2022-11-27 | End: 2022-11-27

## 2022-11-27 RX ORDER — DEXAMETHASONE SODIUM PHOSPHATE 10 MG/ML
INJECTION, SOLUTION INTRAMUSCULAR; INTRAVENOUS AS NEEDED
Status: DISCONTINUED | OUTPATIENT
Start: 2022-11-27 | End: 2022-11-27

## 2022-11-27 RX ORDER — FENTANYL CITRATE 50 UG/ML
INJECTION, SOLUTION INTRAMUSCULAR; INTRAVENOUS AS NEEDED
Status: DISCONTINUED | OUTPATIENT
Start: 2022-11-27 | End: 2022-11-27

## 2022-11-27 RX ORDER — FENTANYL CITRATE/PF 50 MCG/ML
50 SYRINGE (ML) INJECTION
Status: DISCONTINUED | OUTPATIENT
Start: 2022-11-27 | End: 2022-11-27 | Stop reason: HOSPADM

## 2022-11-27 RX ORDER — LIDOCAINE HYDROCHLORIDE 10 MG/ML
INJECTION, SOLUTION EPIDURAL; INFILTRATION; INTRACAUDAL; PERINEURAL AS NEEDED
Status: DISCONTINUED | OUTPATIENT
Start: 2022-11-27 | End: 2022-11-27

## 2022-11-27 RX ORDER — SODIUM CHLORIDE, SODIUM LACTATE, POTASSIUM CHLORIDE, CALCIUM CHLORIDE 600; 310; 30; 20 MG/100ML; MG/100ML; MG/100ML; MG/100ML
INJECTION, SOLUTION INTRAVENOUS CONTINUOUS PRN
Status: DISCONTINUED | OUTPATIENT
Start: 2022-11-27 | End: 2022-11-27

## 2022-11-27 RX ORDER — SODIUM CHLORIDE, SODIUM LACTATE, POTASSIUM CHLORIDE, CALCIUM CHLORIDE 600; 310; 30; 20 MG/100ML; MG/100ML; MG/100ML; MG/100ML
100 INJECTION, SOLUTION INTRAVENOUS CONTINUOUS
Status: DISCONTINUED | OUTPATIENT
Start: 2022-11-27 | End: 2022-11-27

## 2022-11-27 RX ORDER — PROMETHAZINE HYDROCHLORIDE 25 MG/ML
25 INJECTION, SOLUTION INTRAMUSCULAR; INTRAVENOUS ONCE AS NEEDED
Status: DISCONTINUED | OUTPATIENT
Start: 2022-11-27 | End: 2022-11-27 | Stop reason: HOSPADM

## 2022-11-27 RX ADMIN — ONDANSETRON 4 MG: 2 INJECTION INTRAMUSCULAR; INTRAVENOUS at 08:41

## 2022-11-27 RX ADMIN — AMLODIPINE BESYLATE 5 MG: 5 TABLET ORAL at 22:05

## 2022-11-27 RX ADMIN — Medication 10 MG: at 08:30

## 2022-11-27 RX ADMIN — LIDOCAINE HYDROCHLORIDE 50 MG: 10 INJECTION, SOLUTION EPIDURAL; INFILTRATION; INTRACAUDAL; PERINEURAL at 08:21

## 2022-11-27 RX ADMIN — FENTANYL CITRATE 25 MCG: 50 INJECTION INTRAMUSCULAR; INTRAVENOUS at 08:45

## 2022-11-27 RX ADMIN — METOPROLOL TARTRATE 50 MG: 50 TABLET ORAL at 22:05

## 2022-11-27 RX ADMIN — POTASSIUM CHLORIDE 10 MEQ: 750 TABLET, EXTENDED RELEASE ORAL at 22:05

## 2022-11-27 RX ADMIN — Medication 10 MG: at 08:35

## 2022-11-27 RX ADMIN — SODIUM CHLORIDE, SODIUM GLUCONATE, SODIUM ACETATE, POTASSIUM CHLORIDE, MAGNESIUM CHLORIDE, SODIUM PHOSPHATE, DIBASIC, AND POTASSIUM PHOSPHATE 100 ML/HR: .53; .5; .37; .037; .03; .012; .00082 INJECTION, SOLUTION INTRAVENOUS at 05:46

## 2022-11-27 RX ADMIN — DEXAMETHASONE SODIUM PHOSPHATE 10 MG: 10 INJECTION INTRAMUSCULAR; INTRAVENOUS at 08:21

## 2022-11-27 RX ADMIN — DOCUSATE SODIUM 100 MG: 100 CAPSULE, LIQUID FILLED ORAL at 17:17

## 2022-11-27 RX ADMIN — POTASSIUM CHLORIDE 40 MEQ: 14.9 INJECTION, SOLUTION INTRAVENOUS at 12:26

## 2022-11-27 RX ADMIN — SODIUM CHLORIDE, SODIUM LACTATE, POTASSIUM CHLORIDE, AND CALCIUM CHLORIDE: .6; .31; .03; .02 INJECTION, SOLUTION INTRAVENOUS at 08:02

## 2022-11-27 RX ADMIN — PROPOFOL 150 MG: 10 INJECTION, EMULSION INTRAVENOUS at 08:21

## 2022-11-27 RX ADMIN — SODIUM CHLORIDE, SODIUM GLUCONATE, SODIUM ACETATE, POTASSIUM CHLORIDE, MAGNESIUM CHLORIDE, SODIUM PHOSPHATE, DIBASIC, AND POTASSIUM PHOSPHATE 100 ML/HR: .53; .5; .37; .037; .03; .012; .00082 INJECTION, SOLUTION INTRAVENOUS at 09:23

## 2022-11-27 RX ADMIN — OXYCODONE HYDROCHLORIDE 5 MG: 5 TABLET ORAL at 14:51

## 2022-11-27 RX ADMIN — POTASSIUM CHLORIDE 40 MEQ: 14.9 INJECTION, SOLUTION INTRAVENOUS at 10:22

## 2022-11-27 RX ADMIN — CEFTRIAXONE 1000 MG: 1 INJECTION, POWDER, FOR SOLUTION INTRAMUSCULAR; INTRAVENOUS at 14:48

## 2022-11-27 RX ADMIN — FENTANYL CITRATE 25 MCG: 50 INJECTION INTRAMUSCULAR; INTRAVENOUS at 08:27

## 2022-11-27 NOTE — CONSULTS
UROLOGY INPATIENT CONSULT NOTE   Name: Lelia Arias  : 1950  MRN: [id number]     Date of Service: 22  Service Requesting Consult: [facility]  Reason for consultation: Right ureteral stone, Forniceal rupture      History of Present Illness:     Lelia Arias is a 74 y o  female with no prior urologic history who presented to the emergency room with right flank pain  CT was obtained demonstrating a right distal ureteral stone, moderate hydronephrosis and extravasation of contrast consistent with calyceal rupture  UA is equivocal for infection, urine culture pending, WBC 8 5, creatinine 0 99  Prior to presented to the emergency room she had gone to urgent care with gave her a dose of penicillin for concern of UTI  Urine culture was sent and returned with mixed contaminants  Patient was transported to [facility] for stent placement  The patient reports some urinary burning but otherwise denies urinary symptoms        PAST MEDICAL HISTORY:     Past Medical History:   Diagnosis Date   • Hypertension        PAST SURGICAL HISTORY:     Past Surgical History:   Procedure Laterality Date   • APPENDECTOMY         CURRENT MEDICATIONS:     Current Facility-Administered Medications   Medication Dose Route Frequency Provider Last Rate Last Admin   • [MAR Hold] acetaminophen (TYLENOL) tablet 650 mg  650 mg Oral Q6H PRN Amor Moss MD       • [MAR Hold] amLODIPine (NORVASC) tablet 5 mg  5 mg Oral HS Amor Moss MD       • [facility] Hold] calcium carbonate (TUMS) chewable tablet 1,000 mg  1,000 mg Oral Daily PRN Amor Moss MD       • [facility] Hold] cefTRIAXone (ROCEPHIN) 1,000 mg in dextrose 5 % 50 mL IVPB  1,000 mg Intravenous Q24H Amor Moss MD       • [facility] Hold] docusate sodium (COLACE) capsule 100 mg  100 mg Oral BID Amor Moss MD       • [facility] Hold] enoxaparin (LOVENOX) subcutaneous injection 40 mg  40 mg Subcutaneous Daily Amor Moss MD       • [facility] Hold] HYDROmorphone (DILAUDID) injection 0 5 mg  0 5 mg Intravenous Q4H PRN Efren Dolan MD       • [MAR Hold] metoprolol tartrate (LOPRESSOR) tablet 50 mg  50 mg Oral HS Efren Dolan MD       • multi-electrolyte (PLASMALYTE-A/ISOLYTE-S PH 7 4) IV solution  100 mL/hr Intravenous Continuous Efren Dolan  mL/hr at 11/27/22 0546 100 mL/hr at 11/27/22 0546   • [MAR Hold] ondansetron (ZOFRAN) injection 4 mg  4 mg Intravenous Q6H PRN Efren Dolan MD       • [MAR Hold] oxyCODONE (ROXICODONE) IR tablet 2 5 mg  2 5 mg Oral Q4H PRN Efren Dolan MD       • [MAR Hold] oxyCODONE (ROXICODONE) IR tablet 5 mg  5 mg Oral Q4H PRN Efren Dolan MD       • [facility] Hold] potassium chloride (K-DUR,KLOR-CON) CR tablet 10 mEq  10 mEq Oral HS Efren Dolan MD       • [MAR Hold] potassium chloride (K-DUR,KLOR-CON) CR tablet 40 mEq  40 mEq Oral Once Efren Dolan MD       • [MAR Hold] senna (SENOKOT) tablet 17 2 mg  2 tablet Oral Daily Efren Dolan MD         Facility-Administered Medications Ordered in Other Encounters   Medication Dose Route Frequency Provider Last Rate Last Admin   • lactated ringers infusion   Intravenous Continuous PRN Adaline REBECA Lewis   New Bag at 11/27/22 0802       ALLERGIES:   No Known Allergies    SOCIAL HISTORY:     Social History     Socioeconomic History   • Marital status: /Civil Union     Spouse name: None   • Number of children: None   • Years of education: None   • Highest education level: None   Occupational History   • None   Tobacco Use   • Smoking status: Never   • Smokeless tobacco: Never   Vaping Use   • Vaping Use: Never used   Substance and Sexual Activity   • Alcohol use: No   • Drug use: No   • Sexual activity: Yes     Partners: Male   Other Topics Concern   • None   Social History Narrative   • None     Social Determinants of Health     Financial Resource Strain: Not on file   Food Insecurity: Not on file   Transportation Needs: Not on file   Physical Activity: Not on file   Stress: Not on file Social Connections: Not on file   Intimate Partner Violence: Not on file   Housing Stability: Not on file       FAMILY HISTORY:     Family History   Problem Relation Age of Onset   • Hypertension Mother        REVIEW OF SYSTEMS:   Pertinent positives and negatives in HPI      PHYSICAL EXAM:     /69   Pulse 68   Temp 97 7 °F (36 5 °C)   Resp 17   Ht 4' 10" (1 473 m)   Wt 58 1 kg (128 lb)   SpO2 95%   BMI 26 75 kg/m²     General:  Healthy appearing female in no acute distress  Head:  Normocephalic, atraumatic  Eyes: no scleral icterus  ENMT: Nares patent, moist mucous membranes  Cardiovascular:  Regular rate  Respiratory:  Patient has unlabored respirations  Abdomen:  Abdomen nondistended  Musculoskeletal: Normal gait  Neurological: Grossly intact  Psych: Normal affect    LABS:     CBC:   Lab Results   Component Value Date    WBC 6 97 11/27/2022    HGB 10 8 (L) 11/27/2022    HCT 34 0 (L) 11/27/2022    MCV 80 (L) 11/27/2022     11/27/2022       BMP:   Lab Results   Component Value Date    CALCIUM 9 0 11/27/2022    K 3 1 (L) 11/27/2022    CO2 25 11/27/2022     (H) 11/27/2022    BUN 9 11/27/2022    CREATININE 0 68 11/27/2022       IMAGING:     CT ABDOMEN AND PELVIS WITH IV CONTRAST     INDICATION:   Flank pain, kidney stone suspected  Right flank pain, eval pyelo, stone?       COMPARISON:  11/19/2016     TECHNIQUE:  CT examination of the abdomen and pelvis was performed  In addition to portal venous phase postcontrast scanning through the abdomen and pelvis, delayed phase postcontrast scanning was performed  Axial, sagittal, and coronal 2D reformatted   images were created from the source data and submitted for interpretation      Radiation dose length product (DLP) for this visit:  317 mGy-cm     This examination, like all CT scans performed in the [facility], was performed utilizing techniques to minimize radiation dose exposure, including the use of iterative reconstruction and automated exposure control      IV Contrast:  100 mL of iohexol (OMNIPAQUE)  Enteric Contrast:  Enteric contrast was not administered      FINDINGS:     ABDOMEN     LOWER CHEST:  No clinically significant abnormality identified in the visualized lower chest      LIVER/BILIARY TREE:  There is intra and extra hepatic biliary ductal dilatation without obstructing process identified  Common bile duct measures up to 11 mm  Liver is otherwise unremarkable      GALLBLADDER:  Gallbladder is surgically absent      SPLEEN:  Unremarkable      PANCREAS:  Unremarkable      ADRENAL GLANDS:  There is extensive nodularity of the left adrenal gland measuring up to 2 5 cm, not significantly changed from the prior CT  There is thickening of the right adrenal gland, also stable      KIDNEYS/URETERS:       There is right-sided hydronephrosis with perinephric fluid in keeping with calyceal rupture given extravasated excreted contrast   This is secondary to a 4 mm stone in the right ureterovesical junction  There are small nonobstructing right renal   calculi, the largest measuring 5 mm        There is an indeterminate hypodensity in the right kidney measuring approximately 1 5 cm on series 601 image 102  While this is indeterminate, this may have been present previously  There is a 1 2 cm hypodensity in the left upper pole, not present   previously  There is internal density which could represent enhancement  There are numerous cortical and parapelvic cysts on the right including a 1 8 cm mildly hyperdense cyst which was present previously        STOMACH AND BOWEL:  There is colonic diverticulosis without evidence of acute diverticulitis      APPENDIX:  No findings to suggest appendicitis      ABDOMINOPELVIC CAVITY:  No ascites  No pneumoperitoneum    No lymphadenopathy      VESSELS:  Unremarkable for patient's age      PELVIS     REPRODUCTIVE ORGANS:  Unremarkable for patient's age      URINARY BLADDER: There is a cystocele  Urinary bladder is diffusely thick-walled  There is trace perivesical stranding      ABDOMINAL WALL/INGUINAL REGIONS:  Unremarkable      OSSEOUS STRUCTURES:  No acute fracture or destructive osseous lesion      IMPRESSION:     1  Right-sided hydroureteronephrosis secondary to a 4 mm stone in the right ureterovesical junction there is extravasation of excreted contrast on delayed imaging consistent with calyceal rupture  Multiple small nonobstructing right renal calculi also   seen      2   Bladder wall thickening with mild perivesical stranding suspicious for cystitis      3  Indeterminant left renal lesion, not present previously, possibly a complex cyst though solid enhancing lesion is not excluded  A right-sided lesion is also indeterminate though may have been present previously  Further evaluation with ultrasound   is recommended      4   Biliary ductal dilatation likely related to cholecystectomy though slightly out of proportion  No obstruction is seen though correlation with bilirubin/liver enzymes recommended  ASSESSMENT:     74 y o  female with 4 mm right ureterovesical junction stone and calyceal rupture presenting for stent placement  I advised that it is possible that this stone will pass alongside the stent in the interim  If this does not happen we should plan for second-stage ureteroscopic stone extraction  Laurent Price PLAN:     To OR for cysto, right retrograde pyelogram, right ureteral stent placement  Patient receive Rocephin yesterday 3:00 p m   Which is adequate coverage for procedure    Keven Alarcon MD  8:20 AM  [address] [facility]

## 2022-11-27 NOTE — OP NOTE
OPERATIVE REPORT     Name: Fish Myers     MRN: [id number]  Date: 11/27/2022 Time: 9:26 AM     Location:       [facility]   Date of Operation:  11/27/2022   Service: Urology     Pre-op Diagnosis:  Right ureteral calculus    Post-op Diagnosis:  Same     Operation:     Cystoscopy  Right Retrograde Pyelogram   Right ureteral stent placement (6Fr x 22 cm JJ stent)  Fluoroscopic Guidance    Surgeon:  Jackie Ag MD  Assistants:  None      Anesthesia:  General   Drains:  6Fr x 22 cm JJ stent (right)  Estimated Blood Loss:  Minimal   Urine Output:  N/A   Specimens: None  Apparent Intraoperative Complications:  None   Patient Condition:  Stable   Disposition:  PACU  Antibiotic Prophylaxis:  Rocephin     Indications:     74 y o  female with right distal ureteral stone and forniceal rupture presenting for right ureteral stent placement  Risks, benefits and alternatives to treatment were discussed with the patient who elected to proceed with the operation  Findings:    - Procidentia necessitating reduction to facilitate stent placement  - successful placement of right ureteral stent    Operative Report:    The patient was identified, and the procedure verified  After induction of anesthesia the patient was prepped and draped in the dorsolithotomy position  ? A  film was obtained  A 22 5 Fr rigid cystoscope was passed per urethra to the bladder revealing displaced ureteral orifices due to procidentia  The prolapse was reduced with rolled Ray tecs  Cystoscopy was then performed demonstrating orthotopic ureteral orifices  A Sensor wire was used to intubate the right ureteral orifice and was passed up to the renal pelvis under fluoroscopic guidance  A 5Fr open ended ureteral catheter was inserted over the wire and passed up to the renal pelvis  ? The wire was removed and a retrograde pyelogram obtained  ? The wire was replaced and passed up to the kidney under fluoroscopic guidance  ? A 6Fr ?x 22cm? JJ stent was placed in standard retrograde fashion under fluoroscopic guidance  ? Upon removal of the wire an adequate curl was noted in the renal pelvis and the bladder on fluoroscopy  The bladder was emptied  The patient was awoken from anesthesia?and transferred to PACU in satisfactory condition  ?     I was present for the entire procedure       Plan:    - Latricia Redd for discharge from a urology perspective  - Broad spectrum abx, tailor to cultures   - Patient may be referred to Urogyn for prolapse repair if desired   - make arrangements for definitive ureteroscopic stone treatment in the ture    SIGNATURE: Cady Mcallister MD  DATE: 11/27/2022  TIME: 9:26 AM

## 2022-11-27 NOTE — ANESTHESIA POSTPROCEDURE EVALUATION
Post-Op Assessment Note    CV Status:  Stable    Pain management: adequate     Mental Status:  Awake and alert   Hydration Status:  Euvolemic   PONV Controlled:  Controlled   Airway Patency:  Patent      Post Op Vitals Reviewed: Yes      Staff: CRNA, Anesthesiologist         No notable events documented      /61 (11/27/22 0900)    Temp 97 5 °F (36 4 °C) (11/27/22 0900)    Pulse 62 (11/27/22 0900)   Resp 16 (11/27/22 0900)    SpO2 100 % (11/27/22 0900)

## 2022-11-27 NOTE — TREATMENT PLAN
Dayna Salazar is a 41-year-old female not previously known to our service presenting to the urgent care yesterday with irritative lower urinary tract symptoms and prescribed antibiotics  She re-presented to [address] [place] [address] this evening with chief complaint of right flank pain  CT obtained demonstrated 4 mm distal ureteral calculus, associated hydronephrosis and perinephric fluid consistent with calyceal rupture and extravasated contrast       Vitals:    11/26/22 1905   BP: (!) 173/77   Pulse: 79   Resp: 18   Temp:    SpO2: 97%   Last previous temperature 98°    Lab Results   Component Value Date    WBC 8 53 11/26/2022    HGB 12 0 11/26/2022    HCT 37 6 11/26/2022    MCV 80 (L) 11/26/2022     11/26/2022     Lab Results   Component Value Date    SODIUM 134 (L) 11/26/2022    K 3 4 (L) 11/26/2022    CL 98 11/26/2022    CO2 27 11/26/2022    BUN 17 11/26/2022    CREATININE 0 99 11/26/2022    GLUC 105 11/26/2022    CALCIUM 9 4 11/26/2022 11/26/2022 1447 11/26/2022 1507 Urine Microscopic [[id number]]   (Abnormal)   Urine, Clean Catch    Final result Component Value Units   RBC, UA Innumerable Abnormal  /hpf   WBC, UA 10-20 Abnormal  /hpf   Epithelial Cells Occasional /hpf   Bacteria, UA Occasional /hpf   MUCUS THREADS Occasional Abnormal                CT abdomen pelvis with contrast [[id number]] Collected: 11/26/22 1725   Order Status: Completed Updated: 11/26/22 1837   Narrative:     CT ABDOMEN AND PELVIS WITH IV CONTRAST     INDICATION:   Flank pain, kidney stone suspected   Right flank pain, eval pyelo, stone?       COMPARISON:  11/19/2016     TECHNIQUE:  CT examination of the abdomen and pelvis was performed   In addition to portal venous phase postcontrast scanning through the abdomen and pelvis, delayed phase postcontrast scanning was performed  Amy Rotunda, sagittal, and coronal 2D reformatted   images were created from the source data and submitted for interpretation       Radiation dose length product (DLP) for this visit: [id number] mGy-cm    This examination, like all CT scans performed in the [facility], was performed utilizing techniques to minimize radiation dose exposure, including the use of iterative   reconstruction and automated exposure control  IV Contrast:  100 mL of iohexol (OMNIPAQUE)   Enteric Contrast:  Enteric contrast was not administered  FINDINGS:     ABDOMEN     LOWER CHEST:  No clinically significant abnormality identified in the visualized lower chest      LIVER/BILIARY TREE:  There is intra and extra hepatic biliary ductal dilatation without obstructing process identified   Common bile duct measures up to 11 mm   Liver is otherwise unremarkable  GALLBLADDER: Buster Candy is surgically absent  SPLEEN:  Unremarkable  PANCREAS:  Unremarkable  ADRENAL GLANDS: Mamta Gamma is extensive nodularity of the left adrenal gland measuring up to 2 5 cm, not significantly changed from the prior CT   There is thickening of the right adrenal gland, also stable  KIDNEYS/URETERS:       There is right-sided hydronephrosis with perinephric fluid in keeping with calyceal rupture given extravasated excreted contrast   This is secondary to a 4 mm stone in the right ureterovesical junction   There are small nonobstructing right renal   calculi, the largest measuring 5 mm        There is an indeterminate hypodensity in the right kidney measuring approximately 1 5 cm on series 601 image 102   While this is indeterminate, this may have been present previously  Mamta Gamma is a 1 2 cm hypodensity in the left upper pole, not present   previously  Mamta Gamma is internal density which could represent enhancement   There are numerous cortical and parapelvic cysts on the right including a 1 8 cm mildly hyperdense cyst which was present previously        STOMACH AND BOWEL: Mamta Gamma is colonic diverticulosis without evidence of acute diverticulitis  APPENDIX:  No findings to suggest appendicitis  ABDOMINOPELVIC CAVITY:  No ascites   No pneumoperitoneum   No lymphadenopathy  VESSELS:  Unremarkable for patient's age  PELVIS     REPRODUCTIVE ORGANS:  Unremarkable for patient's age  URINARY BLADDER: There is a cystocele   Urinary bladder is diffusely thick-walled  Sneha Frater is trace perivesical stranding  ABDOMINAL WALL/INGUINAL REGIONS:  Unremarkable  OSSEOUS STRUCTURES:  No acute fracture or destructive osseous lesion  Impression:       1   Right-sided hydroureteronephrosis secondary to a 4 mm stone in the right ureterovesical junction there is extravasation of excreted contrast on delayed imaging consistent with calyceal rupture   Multiple small nonobstructing right renal calculi also   seen  2   Bladder wall thickening with mild perivesical stranding suspicious for cystitis  3   Indeterminant left renal lesion, not present previously, possibly a complex cyst though solid enhancing lesion is not excluded   A right-sided lesion is also indeterminate though may have been present previously   Further evaluation with ultrasound   is recommended  4   Biliary ductal dilatation likely related to cholecystectomy though slightly out of proportion   No obstruction is seen though correlation with bilirubin/liver enzymes recommended  The study was marked in [facility] for immediate notification  Workstation performed: [id number]          Interim plan  · Continue aggressive IV fluids, symptom management and antibiosis  · Request transfer to [facility] on the Internal Medicine service  · NPO after midnight for cystoscopy, retrograde pyelography and insertion of ureteral stent (right)  ·  attending, on-call Dr Kota Mcmanus will see patient in formal urologic consultation tomorrow morning preoperatively  · Patient is in agreement  · Discussed with emergency room provider, Dr Garrison Machado

## 2022-11-27 NOTE — PLAN OF CARE
Problem: Potential for Falls  Goal: Patient will remain free of falls  Description: INTERVENTIONS:  - Educate patient/family on patient safety including physical limitations  - Instruct patient to call for assistance with activity   - Consult OT/PT to assist with strengthening/mobility   - Keep Call bell within reach  - Keep bed low and locked with side rails adjusted as appropriate  - Keep care items and personal belongings within reach  - Initiate and maintain comfort rounds  - Make Fall Risk Sign visible to staff  - Offer Toileting every 2 Hours, in advance of need  - Initiate/Maintain bed alarm  - Obtain necessary fall risk management equipment  - Apply yellow socks and bracelet for high fall risk patients  - Consider moving patient to room near nurses station  Outcome: Progressing     Problem: PAIN - ADULT  Goal: Verbalizes/displays adequate comfort level or baseline comfort level  Description: Interventions:  - Encourage patient to monitor pain and request assistance  - Assess pain using appropriate pain scale  - Administer analgesics based on type and severity of pain and evaluate response  - Implement non-pharmacological measures as appropriate and evaluate response  - Consider cultural and social influences on pain and pain management  - Notify physician/advanced practitioner if interventions unsuccessful or patient reports new pain  Outcome: Progressing     Problem: INFECTION - ADULT  Goal: Absence or prevention of progression during hospitalization  Description: INTERVENTIONS:  - Assess and monitor for signs and symptoms of infection  - Monitor lab/diagnostic results  - Monitor all insertion sites, i e  indwelling lines, tubes, and drains  - Monitor endotracheal if appropriate and nasal secretions for changes in amount and color  - [place] appropriate cooling/warming therapies per order  - Administer medications as ordered  - Instruct and encourage patient and family to use good hand hygiene technique  - Identify and instruct in appropriate isolation precautions for identified infection/condition  Outcome: Progressing  Goal: Absence of fever/infection during neutropenic period  Description: INTERVENTIONS:  - Monitor WBC    Outcome: Progressing     Problem: SAFETY ADULT  Goal: Patient will remain free of falls  Description: INTERVENTIONS:  - Educate patient/family on patient safety including physical limitations  - Instruct patient to call for assistance with activity   - Consult OT/PT to assist with strengthening/mobility   - Keep Call bell within reach  - Keep bed low and locked with side rails adjusted as appropriate  - Keep care items and personal belongings within reach  - Initiate and maintain comfort rounds  - Make Fall Risk Sign visible to staff  - Offer Toileting every 2 Hours, in advance of need  - Initiate/Maintain bed alarm  - Obtain necessary fall risk management equipment  - Apply yellow socks and bracelet for high fall risk patients  - Consider moving patient to room near nurses station  Outcome: Progressing  Goal: Maintain or return to baseline ADL function  Description: INTERVENTIONS:  -  Assess patient's ability to carry out ADLs; assess patient's baseline for ADL function and identify physical deficits which impact ability to perform ADLs (bathing, care of mouth/teeth, toileting, grooming, dressing, etc )  - Assess/evaluate cause of self-care deficits   - Assess range of motion  - Assess patient's mobility; develop plan if impaired  - Assess patient's need for assistive devices and provide as appropriate  - Encourage maximum independence but intervene and supervise when necessary  - Involve family in performance of ADLs  - Assess for home care needs following discharge   - Consider OT consult to assist with ADL evaluation and planning for discharge  - Provide patient education as appropriate  Outcome: Progressing  Goal: Maintains/Returns to pre admission functional level  Description: INTERVENTIONS:  - Perform BMAT or MOVE assessment daily    - Set and communicate daily mobility goal to care team and patient/family/caregiver  - Collaborate with rehabilitation services on mobility goals if consulted  - Ambulate patient 3 times a day  - Out of bed to chair 3 times a day   - Out of bed for meals 3 times a day  - Out of bed for toileting  - Record patient progress and toleration of activity level   Outcome: Progressing     Problem: DISCHARGE PLANNING  Goal: Discharge to home or other facility with appropriate resources  Description: INTERVENTIONS:  - Identify barriers to discharge w/patient and caregiver  - Arrange for needed discharge resources and transportation as appropriate  - Identify discharge learning needs (meds, wound care, etc )  - Arrange for interpretive services to assist at discharge as needed  - Refer to Case Management Department for coordinating discharge planning if the patient needs post-hospital services based on physician/advanced practitioner order or complex needs related to functional status, cognitive ability, or social support system  Outcome: Progressing     Problem: Knowledge Deficit  Goal: Patient/family/caregiver demonstrates understanding of disease process, treatment plan, medications, and discharge instructions  Description: Complete learning assessment and assess knowledge base    Interventions:  - Provide teaching at level of understanding  - Provide teaching via preferred learning methods  Outcome: Progressing

## 2022-11-27 NOTE — ANESTHESIA PREPROCEDURE EVALUATION
Procedure:  CYSTOSCOPY RETROGRADE PYELOGRAM WITH INSERTION STENT URETERAL (Right: Bladder)    Relevant Problems   CARDIO   (+) Hypertension      /RENAL   (+) Hydroureteronephrosis        Physical Exam    Airway    Mallampati score: III  TM Distance: >3 FB  Neck ROM: full     Dental   No notable dental hx     Cardiovascular      Pulmonary      Other Findings        Anesthesia Plan  ASA Score- 2     Anesthesia Type- general with ASA Monitors  Additional Monitors:   Airway Plan: LMA  Plan Factors-    Induction- intravenous  Postoperative Plan-     Informed Consent- Anesthetic plan and risks discussed with patient  I personally reviewed this patient with the CRNA  Discussed and agreed on the Anesthesia Plan with the CRNA  Pawan Philip

## 2022-11-27 NOTE — EMTALA/ACUTE CARE TRANSFER
[address] [place] [address]  Dept: [phone]      EMTALA TRANSFER CONSENT    NAME Bryce Lozano 1950                              MRN [id number]    I have been informed of my rights regarding examination, treatment, and transfer   by Dr Hortencia Crouch MD    Benefits: Specialized equipment and/or services available at the receiving facility (Include comment)________________________ (Urologic intervention)    Risks: Potential for delay in receiving treatment, Potential deterioration of medical condition, Loss of IV, Possible worsening of condition or death during transfer      Consent for Transfer:  I acknowledge that my medical condition has been evaluated and explained to me by the emergency department physician or other qualified medical person and/or my attending physician, who has recommended that I be transferred to the service of  Accepting Physician: Dr General Rogers at [address] [facility] Name, [address] : [place]  The above potential benefits of such transfer, the potential risks associated with such transfer, and the probable risks of not being transferred have been explained to me, and I fully understand them  The doctor has explained that, in my case, the benefits of transfer outweigh the risks  I agree to be transferred  I authorize the performance of emergency medical procedures and treatments upon me in both transit and upon arrival at the receiving facility  Additionally, I authorize the release of any and all medical records to the receiving facility and request they be transported with me, if possible  I understand that the safest mode of transportation during a medical emergency is an ambulance and that the Hospital advocates the use of this mode of transport   Risks of traveling to the receiving facility by car, including absence of medical control, life sustaining equipment, such as oxygen, and medical personnel has been explained to me and I fully understand them  (RAMESH CORRECT BOX BELOW)  [  ]  I consent to the stated transfer and to be transported by ambulance/helicopter  [  ]  I consent to the stated transfer, but refuse transportation by ambulance and accept full responsibility for my transportation by car  I understand the risks of non-ambulance transfers and I exonerate the Hospital and its staff from any deterioration in my condition that results from this refusal     X___________________________________________    DATE  22  TIME________  Signature of patient or legally responsible individual signing on patient behalf           RELATIONSHIP TO PATIENT_________________________          Provider Certification    NAME Tee Roque                                         1950                              MRN [id number]    A medical screening exam was performed on the above named patient  Based on the examination:    Condition Necessitating Transfer The primary encounter diagnosis was Flank pain  A diagnosis of Kidney stone was also pertinent to this visit      Patient Condition: The patient has been stabilized such that within reasonable medical probability, no material deterioration of the patient condition or the condition of the unborn child(juliann) is likely to result from the transfer    Reason for Transfer: Level of Care needed not available at this facility (Urologic intervention)    Transfer Requirements: Facility [facility]   · Space available and qualified personnel available for treatment as acknowledged by    · Agreed to accept transfer and to provide appropriate medical treatment as acknowledged by       Dr Hay Tong  · Appropriate medical records of the examination and treatment of the patient are provided at the time of transfer   [address], [address] _______  · Transfer will be performed by qualified personnel from    and appropriate transfer equipment as required, including the use of necessary and appropriate life support measures  Provider Certification: I have examined the patient and explained the following risks and benefits of being transferred/refusing transfer to the patient/family:  General risk, such as traffic hazards, adverse weather conditions, rough terrain or turbulence, possible failure of equipment (including vehicle or aircraft), or consequences of actions of persons outside the control of the transport personnel, Risk of worsening condition, Unanticipated needs of medical equipment and personnel during transport, The possibility of a transport vehicle being unavailable, Consent was not obtained as patient is committed to psychiatric facility and transfer is mandated      Based on these reasonable risks and benefits to the patient and/or the unborn child(juliann), and based upon the information available at the time of the patient’s examination, I certify that the medical benefits reasonably to be expected from the provision of appropriate medical treatments at another medical facility outweigh the increasing risks, if any, to the individual’s medical condition, and in the case of labor to the unborn child, from effecting the transfer      X____________________________________________ DATE 11/26/22        TIME_______      ORIGINAL - SEND TO MEDICAL RECORDS   COPY - SEND WITH PATIENT DURING TRANSFER

## 2022-11-27 NOTE — ASSESSMENT & PLAN NOTE
· With possible calyceal rupture  · IV fluids  · Analgesics and antiemetics PRN  · IV ceftriaxone  · For cysto, right retrograde pyelogram, right ureteral stent placement today

## 2022-11-27 NOTE — ASSESSMENT & PLAN NOTE
· UCx contaminated  · No leukocytosis or fever  · Continue IV ceftriaxone for now with urological intervention today

## 2022-11-27 NOTE — ASSESSMENT & PLAN NOTE
· With possible calyceal rupture  · IV fluids  · Analgesics and antiemetics PRN  · IV ceftriaxone  · Await cultures  · Consult urology for a cystoscopy  · NPO after midnight

## 2022-11-27 NOTE — H&P
[facility]  H&P- Elayne Alvarez 1950, 74 y o  female MRN: [id number]  Unit/Bed#: [facility] 930-01 Encounter: [id number]  Primary Care Provider: Daylin Heller MD   Date and time admitted to hospital: No admission date for patient encounter  * Hydroureteronephrosis  Assessment & Plan  · With possible calyceal rupture  · IV fluids  · Analgesics and antiemetics PRN  · IV ceftriaxone  · Await cultures  · Consult urology for a cystoscopy  · NPO after midnight    UTI (urinary tract infection)  Assessment & Plan  · Await cultures  · Continue IV ceftriaxone  · Management otherwise as above  Hypertension  Assessment & Plan  · Continue amlodipine and metoprolol  · Hold HCTZ while NPO    Hypokalemia  Assessment & Plan  · PO supplementation  · Check BMP      VTE Pharmacologic Prophylaxis:   Moderate Risk (Score 3-4) - Pharmacological DVT Prophylaxis Ordered: enoxaparin (Lovenox)  Code Status: No Order   Discussion with family: Patient declined call to   Anticipated Length of Stay: Patient will be admitted on an inpatient basis with an anticipated length of stay of greater than 2 midnights secondary to ureteral stone  Total Time for Visit, including Counseling / Coordination of Care: 45 minutes Greater than 50% of this total time spent on direct patient counseling and coordination of care  Chief Complaint: right flank pain    History of Present Illness:  Elayne Alvarez is a 74 y o  female with a PMH of HTN who presents with right flank pain  She initially presented to urgent care and was prescribed antibiotics  Today she presented to the ED and was found to have a 4mm ureteral calculus causing hydronephrosis and perinephric fluid consistent with calyceal rupture       Review of Systems:  Review of Systems   All other systems reviewed and are negative        Past Medical and Surgical History:   Past Medical History:   Diagnosis Date   • Hypertension Past Surgical History:   Procedure Laterality Date   • APPENDECTOMY         Meds/Allergies:  Prior to Admission medications    Medication Sig Start Date End Date Taking? Authorizing Provider   amLODIPine (NORVASC) 5 mg tablet Take 5 mg by mouth daily at bedtime 4/26/22   Historical Provider, MD   hydrochlorothiazide (HYDRODIURIL) 25 mg tablet Take 25 mg by mouth daily    Historical Provider, MD   metoprolol tartrate (LOPRESSOR) 100 mg tablet Take 50 mg by mouth daily at bedtime    Historical Provider, MD   penicillin V potassium (VEETID) 500 mg tablet Take 1 tablet (500 mg total) by mouth every 8 (eight) hours for 7 days  Patient not taking: Reported on 11/26/2022 11/25/22 12/2/22  PARESH Hamm   potassium chloride (Klor-Con) 10 mEq tablet Take 10 mEq by mouth daily at bedtime 5/20/22   Historical Provider, MD   ciprofloxacin (CIPRO) 500 mg tablet Take 500 mg by mouth 2 (two) times a day 11/2/22 11/26/22  Historical Provider, MD   doxazosin (CARDURA) 2 mg tablet Take 2 mg by mouth daily at bedtime  11/26/22  Historical Provider, MD   lisinopril (ZESTRIL) 20 mg tablet Take 20 mg by mouth 2 (two) times a day 8/23/22 11/26/22  Historical Provider, MD   lisinopril (ZESTRIL) 40 mg tablet Take 40 mg by mouth daily  11/26/22  Historical Provider, MD   metoprolol succinate (TOPROL-XL) 100 mg 24 hr tablet Take 100 mg by mouth daily 5/19/22 11/26/22  Historical Provider, MD   metroNIDAZOLE (FLAGYL) 500 mg tablet Take 500 mg by mouth 2 (two) times a day 11/2/22 11/26/22  Historical Provider, MD   potassium chloride SA (KLOR-CON M15) 15 MEQ tablet Take 15 mEq by mouth 2 (two) times a day  11/26/22  Historical Provider, MD     I have reviewed home medications using recent Epic encounter      Allergies: No Known Allergies    Social History:  Marital Status: /Civil Union   Occupation: retired  Patient Pre-hospital Living Situation: Home  Patient Pre-hospital Level of Mobility: walks  Patient Pre-hospital Diet Restrictions: None  Substance Use History:   Social History     Substance and Sexual Activity   Alcohol Use No     Social History     Tobacco Use   Smoking Status Never   Smokeless Tobacco Never     Social History     Substance and Sexual Activity   Drug Use No       Family History:  No family history on file  Physical Exam:     Vitals:        Physical Exam  Constitutional:       Appearance: Normal appearance  HENT:      Head: Normocephalic and atraumatic  Nose: Nose normal    Eyes:      Extraocular Movements: Extraocular movements intact  Cardiovascular:      Rate and Rhythm: Normal rate and regular rhythm  Pulmonary:      Effort: Pulmonary effort is normal       Breath sounds: No wheezing or rales  Abdominal:      General: There is no distension  Palpations: Abdomen is soft  Tenderness: There is no right CVA tenderness or left CVA tenderness  Musculoskeletal:      Right lower leg: No edema  Left lower leg: No edema  Skin:     General: Skin is warm and dry  Neurological:      General: No focal deficit present  Mental Status: She is alert and oriented to person, place, and time     Psychiatric:         Mood and Affect: Mood normal          Behavior: Behavior normal           Additional Data:     Lab Results:  Results from last 7 days   Lab Units 11/26/22  1447   WBC Thousand/uL 8 53   HEMOGLOBIN g/dL 12 0   HEMATOCRIT % 37 6   PLATELETS Thousands/uL 269   NEUTROS PCT % 59   LYMPHS PCT % 27   MONOS PCT % 10   EOS PCT % 4     Results from last 7 days   Lab Units 11/26/22  1447   SODIUM mmol/L 134*   POTASSIUM mmol/L 3 4*   CHLORIDE mmol/L 98   CO2 mmol/L 27   BUN mg/dL 17   CREATININE mg/dL 0 99   ANION GAP mmol/L 9   CALCIUM mg/dL 9 4   ALBUMIN g/dL 3 9   TOTAL BILIRUBIN mg/dL 0 50   ALK PHOS U/L 42   ALT U/L 13   AST U/L 17   GLUCOSE RANDOM mg/dL 105                       Lines/Drains:  Invasive Devices     Peripheral Intravenous Line  Duration           Peripheral IV 11/26/22 Right Antecubital <1 day                    Imaging: Reviewed radiology reports from this admission including: abdominal/pelvic CT  No orders to display       EKG and Other Studies Reviewed on Admission:   · EKG: No EKG obtained  ** Please Note: This note has been constructed using a voice recognition system   **

## 2022-11-27 NOTE — PROGRESS NOTES
[address] [facility]  Progress Note - Linus Chacon 1950, 74 y o  female MRN: [id number]  Unit/Bed#: [facility] 930-01 Encounter: [id number]  Primary Care Provider: Rubia Parsons MD   Date and time admitted to hospital: 2022  9:41 PM    * Hydroureteronephrosis  Assessment & Plan  · With possible calyceal rupture  · IV fluids  · Analgesics and antiemetics PRN  · IV ceftriaxone  · For cysto, right retrograde pyelogram, right ureteral stent placement today    UTI (urinary tract infection)  Assessment & Plan  · UCx contaminated  · No leukocytosis or fever  · Continue IV ceftriaxone for now with urological intervention today    Hypokalemia  Assessment & Plan  · IV K    Primary hypertension  Assessment & Plan  · Continue amlodipine and metoprolol  · HCTZ currently on hold        VTE Pharmacologic Prophylaxis:   Lovenox    Patient Centered Rounds: I performed bedside rounds with nursing staff today  Education and Discussions with Family / Patient: 3 family members at bedside    Time Spent for Care: 20 minutes  More than 50% of total time spent on counseling and coordination of care as described above  Current Length of Stay: 1 day(s)  Current Patient Status: Inpatient   Certification Statement: The patient will continue to require additional inpatient hospital stay due to ureteral stent  Discharge Plan: Anticipate discharge tomorrow to home  Code Status: Level 1 - Full Code    Subjective:   Pt denies CP/SOB/abd pain  Objective:     Vitals:   Temp (24hrs), Av 7 °F (36 5 °C), Min:97 5 °F (36 4 °C), Max:98 °F (36 7 °C)    Temp:  [97 5 °F (36 4 °C)-98 °F (36 7 °C)] 97 6 °F (36 4 °C)  HR:  [62-79] 66  Resp:  [12-18] 13  BP: (107-186)/(55-88) 131/65  SpO2:  [93 %-100 %] 96 %  Body mass index is 26 75 kg/m²  Input and Output Summary (last 24 hours):      Intake/Output Summary (Last 24 hours) at 2022 0954  Last data filed at 2022 0856  Gross per 24 hour   Intake 1280 ml   Output --   Net 1280 ml       Physical Exam:   Gen: NAD, Awake and alert, well developed, well nourished  Eyes: EOMI, PERRLA, no scleral icterus  ENMT:  no nasal discharge, no otic discharge, moist mucous membranes  Neck:  Supple  Cardiovascular:  Regular rate and rhythm, normal S1-S2, no murmurs, rubs, or gallops  Lungs:  Clear to auscultation bilaterally, no wheezes, or rales, or rhonchi  Abdomen:  Positive bowel sounds, soft, nontender, nondistended, no palpable organomegaly   Skin:  Intact, no obvious lesions or rashes, no edema  Neuro: Cranial nerves 2-12 are intact, non-focal, 5/5 strength in all 4 extremities      Additional Data:     Labs:  Results from last 7 days   Lab Units 11/27/22  0529 11/26/22  1447   WBC Thousand/uL 6 97 8 53   HEMOGLOBIN g/dL 10 8* 12 0   HEMATOCRIT % 34 0* 37 6   PLATELETS Thousands/uL 246 269   NEUTROS PCT %  --  59   LYMPHS PCT %  --  27   MONOS PCT %  --  10   EOS PCT %  --  4     Results from last 7 days   Lab Units 11/27/22  0529 11/26/22  1447   SODIUM mmol/L 141 134*   POTASSIUM mmol/L 3 1* 3 4*   CHLORIDE mmol/L 109* 98   CO2 mmol/L 25 27   BUN mg/dL 9 17   CREATININE mg/dL 0 68 0 99   ANION GAP mmol/L 7 9   CALCIUM mg/dL 9 0 9 4   ALBUMIN g/dL  --  3 9   TOTAL BILIRUBIN mg/dL  --  0 50   ALK PHOS U/L  --  42   ALT U/L  --  13   AST U/L  --  17   GLUCOSE RANDOM mg/dL 100 105                       Lines/Drains:  Invasive Devices     Peripheral Intravenous Line  Duration           Peripheral IV 11/26/22 Right Antecubital <1 day                Recent Cultures (last 7 days):   Results from last 7 days   Lab Units 11/25/22  1909   URINE CULTURE  >100,000 cfu/ml       Last 24 Hours Medication List:   Current Facility-Administered Medications   Medication Dose Route Frequency Provider Last Rate   • acetaminophen  650 mg Oral Q6H PRN Morena Rahman MD     • amLODIPine  5 mg Oral HS Morena Rahman MD     • calcium carbonate  1,000 mg Oral Daily PRN Morena Rahman MD     • cefTRIAXone  1,000 mg Intravenous Q24H Erick José MD     • docusate sodium  100 mg Oral BID Erick José MD     • enoxaparin  40 mg Subcutaneous Daily Erick José MD     • HYDROmorphone  0 5 mg Intravenous Q4H PRN Erick José MD     • lactated ringers  100 mL/hr Intravenous Continuous Adaline REBECA Lewis 100 mL/hr (11/27/22 0906)   • metoprolol tartrate  50 mg Oral HS Erick José MD     • multi-electrolyte  100 mL/hr Intravenous Continuous Erick José  mL/hr (11/27/22 9331)   • ondansetron  4 mg Intravenous Q6H PRN Erick José MD     • oxyCODONE  2 5 mg Oral Q4H PRN Erick José MD     • oxyCODONE  5 mg Oral Q4H PRN Erick José MD     • potassium chloride  10 mEq Oral HS Erick José MD     • potassium chloride  40 mEq Oral Once Erick José MD     • potassium chloride  40 mEq Intravenous Yudi Kearns MD     • senna  2 tablet Oral Daily Erick José MD          Today, Patient Was Seen By: Erick José MD    **Please Note: This note may have been constructed using a voice recognition system  **

## 2022-11-27 NOTE — ED NOTES
Transfer Information:    Ambulance Squad: Rajendra Time: 2115   Destination: B P930   Accepting Physician: Dr Nevaeh Witt   Report Number: [phone]         Mary Thomas RN  11/26/22 2056

## 2022-11-28 VITALS
BODY MASS INDEX: 26.87 KG/M2 | HEIGHT: 58 IN | RESPIRATION RATE: 17 BRPM | SYSTOLIC BLOOD PRESSURE: 153 MMHG | TEMPERATURE: 97.3 F | WEIGHT: 128 LBS | HEART RATE: 102 BPM | DIASTOLIC BLOOD PRESSURE: 80 MMHG | OXYGEN SATURATION: 96 %

## 2022-11-28 PROBLEM — R82.90 ABNORMAL URINALYSIS: Status: ACTIVE | Noted: 2022-11-26

## 2022-11-28 PROBLEM — D64.9 ANEMIA: Status: ACTIVE | Noted: 2022-11-28

## 2022-11-28 LAB — BACTERIA UR CULT: NORMAL

## 2022-11-28 RX ADMIN — ENOXAPARIN SODIUM 40 MG: 40 INJECTION SUBCUTANEOUS at 08:20

## 2022-11-28 RX ADMIN — SENNOSIDES 17.2 MG: 8.6 TABLET, FILM COATED ORAL at 08:21

## 2022-11-28 RX ADMIN — DOCUSATE SODIUM 100 MG: 100 CAPSULE, LIQUID FILLED ORAL at 08:20

## 2022-11-28 NOTE — TELEPHONE ENCOUNTER
Post Op Note    Damian Jenkins is a 74 y o  female s/p CYSTOSCOPY RETROGRADE PYELOGRAM WITH INSERTION STENT URETERAL (Right: Bladder) performed 11-  Damian Jenkins is a patient of Dr Teressa Danielson and is seen at the [facility] while on call  How would you rate your pain on a scale from 1 to 10, 10 being the worst pain ever? 9  Have you had a fever? No    Do you have any difficulty urinating? No    Do you have any other questions or concerns that I can address at this time? Spoke with patient's son and he informed me that patient has been experiencing pain and discomfort since procedure yesterday  Pt is rating her pain level 8-9 out of 10 with urination and movement  Pt is currently taking OTC Advil with no relief  Son is asking if there is something different she can take to help manage her symptoms  Informed son that I will send a message to the provider team with patient's symptoms and the office will contact the patient and/ or son with recommendations  He is also aware of plan for next steps and stent management  He will await call from surgery team to schedule

## 2022-11-28 NOTE — ASSESSMENT & PLAN NOTE
· Mild, hgb 10 8 today, decreased from admission  · Likely dilutional from IVF hydration while inpatient  · Pt's family member also reports pt with hx of anemia in the past   · No significant bleeding noted  · Recommend outpatient CBC with PCP as an outpatient

## 2022-11-28 NOTE — TELEPHONE ENCOUNTER
Mark Castillo is a 68-year-old female seen in urologic consultation at [facility] for obstructing ureteral calculus  Status post cystoscopy, retrograde pyelography and right ureteral stent insertion by Dr Leighann Beatty on-call at [facility] 11/27/2022  She will require definitive ureteroscopy  Please contact patient per protocol to arrange for definitive stone treatment in approximately 4--6 weeks  Thank you

## 2022-11-28 NOTE — DISCHARGE INSTRUCTIONS
Please follow up with your primary care provider within one week   Please follow up with urology   Obtain blood work outpatient to monitor potassium levels and hemoglobin  If you begin to have any worsening bleeding, flank pain please come back to the hospital for further evaluation

## 2022-11-28 NOTE — INCIDENTAL FINDINGS
The following findings require follow up:  Radiographic finding   Finding: Indeterminate left renal lesion, not present previously, possibly a complex cyst though solid enhancing lesion is not excluded  Right sided lesion also indeterminate though may have been present previously  Biliary ductal dilatation likely related to cholecystectomy though slightly out of proportion      Follow up required: Renal US    Follow up should be done within 1 month(s)    Please notify the following clinician to assist with the follow up:   Primary care provider, urology

## 2022-11-28 NOTE — UTILIZATION REVIEW
Initial Clinical Review    Admission: Date/Time/Statement:   Admission Orders (From admission, onward)     Ordered        11/26/22 2211  Inpatient Admission  Once            11/26/22 2210  Inpatient Admission  Once                      Orders Placed This Encounter   Procedures   • Inpatient Admission     Standing Status:   Standing     Number of Occurrences:   1     Order Specific Question:   Level of Care     Answer:   Med Surg [16]     Order Specific Question:   Estimated length of stay     Answer:   More than 2 Midnights     Order Specific Question:   Certification     Answer:   I certify that inpatient services are medically necessary for this patient for a duration of greater than two midnights  See H&P and MD Progress Notes for additional information about the patient's course of treatment  • Inpatient Admission     Standing Status:   Standing     Number of Occurrences:   1     Order Specific Question:   Level of Care     Answer:   Med Surg [16]     Order Specific Question:   Estimated length of stay     Answer:   More than 2 Midnights     Order Specific Question:   Certification     Answer:   I certify that inpatient services are medically necessary for this patient for a duration of greater than two midnights  See H&P and MD Progress Notes for additional information about the patient's course of treatment  ED Arrival Information     Patient not seen in ED                     No chief complaint on file  Initial Presentation: 74 y o  female   Seen at Urgent care on  11/25  W/c/o  irritative lower UTI symptoms;  Dip positive for large leukocytes and large blood; Dc home on  Po Pencillin  Presented to  [facility] ER  11/26  W/c/o  Rt flank pain     CTAP demonstrated 4 mm distal ureteral calculus, associated hydronephrosis and perinephric fluid consistent with calyceal rupture and extravasated contrast    Will transfer to  [facility] for higher level of care/  [facility]   status,    MS Level of care  For  Management of obstructing Ureteral stone 2/hydronephrosis and possible calyceal rupture  With STENT PLACEMENT  UA is equivocal for infection, urine culture pending, WBC 8 5, creatinine 0 99  Will keep NPO,  Cont IVF Hydration,  Initiate IV Ceftriaxone,  Provide analgesics/antiemetics prn  FUP urine cultures  Consult Urology for ureteral stent placement  Date:   11/27       Day 2:   OP NOTE:   Cystoscopy,  Right Retrograde Pyelogram,  Right ureteral stent placement (6Fr x 22 cm JJ stent)   Fluoroscopic Guidance  Findings:  Procidentia necessitating reduction to facilitate stent placement  successful placement of right ureteral stent    11/27   K+  Repleted   Cont abx  Pain control  I/O q shift          Wt Readings from Last 1 Encounters:   11/26/22 58 1 kg (128 lb)     Additional Vital Signs:   11/27/22 15:25:27 97 7 °F (36 5 °C) -- 18 137/78 98 -- -- --   11/27/22 0930 97 6 °F (36 4 °C) 66 13 131/65 -- 96 % 2 L/min Nasal cannula   11/27/22 0915 -- 64 12 121/55 80 97 % 2 L/min Nasal cannula   11/27/22 0900 97 5 °F (36 4 °C) 62 16 113/61 77 100 % -- None (Room air)   11/27/22 0858 97 5 °F (36 4 °C) 62 16 107/59 -- 100 % 6 L/min Simple mask   11/27/22 06:19:33 97 7 °F (36 5 °C) 68 17 132/69 90 95 % -- --     Pertinent Labs/Diagnostic Test Results:   FL retrograde pyelogram    (Results Pending)         Results from last 7 days   Lab Units 11/27/22  0529 11/26/22  1447   WBC Thousand/uL 6 97 8 53   HEMOGLOBIN g/dL 10 8* 12 0   HEMATOCRIT % 34 0* 37 6   PLATELETS Thousands/uL 246 269   NEUTROS ABS Thousands/µL  --  5 08         Results from last 7 days   Lab Units 11/27/22  0529 11/26/22  1447   SODIUM mmol/L 141 134*   POTASSIUM mmol/L 3 1* 3 4*   CHLORIDE mmol/L 109* 98   CO2 mmol/L 25 27   ANION GAP mmol/L 7 9   BUN mg/dL 9 17   CREATININE mg/dL 0 68 0 99   EGFR ml/min/1 73sq m 86 56   CALCIUM mg/dL 9 0 9 4 Results from last 7 days   Lab Units 11/26/22  1447   AST U/L 17   ALT U/L 13   ALK PHOS U/L 42   TOTAL PROTEIN g/dL 7 6   ALBUMIN g/dL 3 9   TOTAL BILIRUBIN mg/dL 0 50         Results from last 7 days   Lab Units 11/27/22  0529 11/26/22  1447   GLUCOSE RANDOM mg/dL 100 105       Results from last 7 days   Lab Units 11/26/22  1447   LIPASE u/L 23     Results from last 7 days   Lab Units 11/26/22  1447 11/25/22  1907   CLARITY UA  Clear Hazy   COLOR UA  Colorless Yellow   SPEC GRAV UA  1 006  --    PH UA  7 0  --    GLUCOSE UA mg/dl Negative Negative   KETONES UA mg/dl Negative Negative   BLOOD UA  Moderate* Large   PROTEIN UA mg/dl 50 (1+)* Trace   NITRITE UA  Negative Negative   BILIRUBIN UA  Negative  --    BILIRUBIN UA POC   --  Negative   UROBILINOGEN UA   --  0 2   UROBILINOGEN UA (BE) mg/dl <2 0  --    LEUKOCYTES UA  Small* Moderate   WBC UA /hpf 10-20*  --    RBC UA /hpf Innumerable*  --    BACTERIA UA /hpf Occasional  --    EPITHELIAL CELLS WET PREP /hpf Occasional  --    MUCUS THREADS  Occasional*  --        Results from last 7 days   Lab Units 11/25/22  1909   URINE CULTURE  >100,000 cfu/ml       Past Medical History:   Diagnosis Date   • Hypertension      Present on Admission:  • Hydroureteronephrosis  • UTI (urinary tract infection)  • Primary hypertension  • Hypokalemia      Admitting Diagnosis: Flank pain [R10 9]  Age/Sex: 74 y o  female  Admission Orders:   See above note     Scheduled Medications:  amLODIPine, 5 mg, Oral, HS  cefTRIAXone, 1,000 mg, Intravenous, Q24H  docusate sodium, 100 mg, Oral, BID  enoxaparin, 40 mg, Subcutaneous, Daily  metoprolol tartrate, 50 mg, Oral, HS  senna, 2 tablet, Oral, Daily      11/27  IV Kcl 20 meq x2;  Po Kdur x1    Continuous IV Infusions:     PRN Meds:  acetaminophen, 650 mg, Oral, Q6H PRN  calcium carbonate, 1,000 mg, Oral, Daily PRN  HYDROmorphone, 0 5 mg, Intravenous, Q4H PRN  ondansetron, 4 mg, Intravenous, Q6H PRN  oxyCODONE, 2 5 mg, Oral, Q4H PRN  oxyCODONE, 5 mg, Oral, Q4H PRN      11/27  @  145        IP CONSULT TO UROLOGY    Network Utilization Review Department  ATTENTION: Please call with any questions or concerns to [phone] and carefully listen to the prompts so that you are directed to the right person  All voicemails are confidential   Brittaney Figueroa all requests for admission clinical reviews, approved or denied determinations and any other requests to dedicated fax number below belonging to the campus where the patient is receiving treatment   List of dedicated fax numbers for the Facilities:  [address] DENIALS (Administrative/Medical Necessity) [phone]   [address] (Maternity/NICU/Pediatrics) [phone]   912 Basia Aquino [phone]   [facility] José  [phone]   [address] [address] Lawanda Cohen [facility] 28 [phone]   155 [facility] Juan Miguel Buckner [facility] 134 815 [facility] [phone]

## 2022-11-28 NOTE — ASSESSMENT & PLAN NOTE
· Pt presented with right sided flank pain  · CT with right obstructing ureteral calculus  · Urology evaluated,  · POD #1 s/p cystoscopy, retrograde pyelography and right ureteral stent placement   · Urine cultures with mixed contaminants/no growth   · Will d/c further abx on discharge   · Pt to follow up with urogyn for prolapse repair if needed and will need definitive ureteroscopic stone treatment in the future

## 2022-11-28 NOTE — ASSESSMENT & PLAN NOTE
· Continue amlodipine 5 mg daily and lopressor 50 mg daily   · HCTZ to resume on discharge   · Follow up with PCP for further management outpatient and monitor potassium levels, continue supplementation on discharge

## 2022-11-28 NOTE — TREATMENT PLAN
Raúl Parker is a 68-year-old female presenting with right flank pain and CT confirmed right obstructing ureteral calculus  Postprocedure day 0 cystoscopy, retrograde pyelography and insertion of right ureteral stent  Plan:    - Estefania Worley for discharge from a urology perspective  - Broad spectrum abx, tailor to cultures   - Patient may be referred to Urogyn for prolapse repair if desired   - make arrangements for definitive ureteroscopic stone treatment in the ture     Service contact patient/caregiver with hospital follow-up appointment date and time once discharged    No further  intervention indicated during this hospital stay

## 2022-11-28 NOTE — UTILIZATION REVIEW
NOTIFICATION OF INPATIENT ADMISSION   AUTHORIZATION REQUEST   SERVICING FACILITY:   [facility]  Address: [address] [place] [address], [address] [place] [address]  Tax ID: [id number]  NPI: [id number] ATTENDING PROVIDER:  Attending Name and NPI#: Victor M García Md [[id number]]  Address: [address] [place] [address]  Phone: [phone]   ADMISSION INFORMATION:  [facility]patient [facility]  Place of Service Code: 21  Inpatient Admission Date/Time: 11/26/22  9:41 PM  Discharge Date/Time: No discharge date for patient encounter  Admitting Diagnosis Code/Description:  Flank pain [R10 9]     UTILIZATION REVIEW CONTACT:  Maren Bonner, Utilization   Network Utilization Review Department  Phone: [phone]  Fax: [phone]  Email: [email or web address]  Contact for approvals/pending authorizations, clinical reviews, and discharge  PHYSICIAN ADVISORY SERVICES:  Medical Necessity Denial & Peyk-nq-Jqnw Review  Phone: [phone]  Fax: [phone]  Email: [email or web address]

## 2022-11-28 NOTE — DISCHARGE SUMMARY
[facility]  Discharge- Amanda Becerra 1950, 74 y o  female MRN: [id number]  Unit/Bed#: [facility] 930-01 Encounter: [id number]  Primary Care Provider: Galilea Bolanos MD   Date and time admitted to hospital: 11/26/2022  9:41 PM      DOS: 11/28/2022  * Hydroureteronephrosis  Assessment & Plan  · Pt presented with right sided flank pain  · CT with right obstructing ureteral calculus  · Urology evaluated,  · POD #1 s/p cystoscopy, retrograde pyelography and right ureteral stent placement   · Urine cultures with mixed contaminants/no growth   · Will d/c further abx on discharge   · Pt to follow up with urogyn for prolapse repair if needed and will need definitive ureteroscopic stone treatment in the future    Anemia  Assessment & Plan  · Mild, hgb 10 8 today, decreased from admission  · Likely dilutional from IVF hydration while inpatient  · Pt's family member also reports pt with hx of anemia in the past   · No significant bleeding noted  · Recommend outpatient CBC with PCP as an outpatient     Hypokalemia  Assessment & Plan  · K+ 3 1  · Repleted with 40 mEq IV potassium yesterday   · No repeat blood work obtained this AM   · Continue daily 10 mEq potassium supplementation on discharge   · Follow up outpatient and monitor while on HCTZ    Abnormal urinalysis  Assessment & Plan  · UA (+) for blood, leukocytes and occasional bacteria   · Urine cultures negative, mixed contaminants  · D/c further abx on discharge  · Pt is hemodynamically stable without fevers    Primary hypertension  Assessment & Plan  · Continue amlodipine 5 mg daily and lopressor 50 mg daily   · HCTZ to resume on discharge   · Follow up with PCP for further management outpatient and monitor potassium levels, continue supplementation on discharge    Medical Problems     Resolved Problems  Date Reviewed: 11/28/2022   None       Discharging Physician / Practitioner: Antonette Gill PA-C  PCP: Galilea Bolanos MD  Admission Date:   Admission Orders (From admission, onward)     Ordered        11/26/22 2211  Inpatient Admission  Once            11/26/22 2210  Inpatient Admission  Once                      Discharge Date: 11/28/22    Consultations During Hospital Stay:  · Urology    Procedures Performed:   · CT abdomen/pelvis 11/26 - Right sided hydroureteronephrosis secondary to 4 mm stone in right ureterovesical junction there is extravasation of excreted contrast on delayed imaging consistent with calyceal rupture  Multiple small non obstructing right renal calculi seen  Bladder wall thickening with mild perivesical stranding suspicious for cystitis  Indeterminant left renal lesion, not present previously, possibly a complex cyst though solid enhancing lesion not excluded  Biliary ductal dilatation likely related to cholecystectomy though slightly out of proportion  NO obstruction seen though correlation with bilirubin/liver enzymes recommended  · Cystoscopy/ureteral stent placement 11/27    Significant Findings / Test Results:   · K+ 3 1, repleted prior to discharge  · UA (+) blood, leukocytes and occasional bacteria   · Urine culture with mixed contaminants, other with no growth     Incidental Findings:   · Renal lesions, will need follow up [address] outpatient   · I reviewed the above mentioned incidental findings with the patient and/or family and they expressed understanding  Test Results Pending at Discharge (will require follow up): · None     Outpatient Tests Requested:  · Per PCP, recommend BMP and CBC to monitor potassium and hgb     Complications:  None    Reason for Admission: Right sided flank pain     Hospital Course:   Sanjana Anderson is a 74 y o  female patient with significant past medical history of HTN who originally presented to the hospital on 11/26/2022 due to right sided flank pain  CT scan on admission positive with right ureteral calculus causing hydronephrosis   Pt was seen by urology and underwent cystoscopy and right ureteral stent placement  Pt's symptoms improved significantly and she was stable for discharge from urology standpoint with outpatient follow up  Pt did not require any antibiotics on discharge  She was discharged in stable condition  For additional information please refer to medical records  Medication changes include: None       Please see above list of diagnoses and related plan for additional information  Condition at Discharge: stable    Discharge Day Visit / Exam:   Subjective:  Pt reports that she is doing well today  She denies any additional flank or abdominal pain  Appetite is good, has some mild hematuria related to recent stent placement, no significant gross hematuria noted  Vitals: Blood Pressure: 153/80 (11/28/22 0829)  Pulse: 102 (11/27/22 2144)  Temperature: (!) 97 3 °F (36 3 °C) (11/28/22 0829)  Temp Source: Temporal (11/26/22 2148)  Respirations: 17 (11/28/22 0829)  Height: 4' 10" (147 3 cm) (11/26/22 2328)  Weight - Scale: 58 1 kg (128 lb) (11/26/22 2148)  SpO2: 96 % (11/27/22 0930)  Exam:   Physical Exam  Vitals reviewed  Constitutional:       General: She is not in acute distress  Appearance: She is not toxic-appearing  Comments: Pt is in no acute distress lying in her hospital bed resting comfortably    HENT:      Head: Normocephalic and atraumatic  Cardiovascular:      Rate and Rhythm: Normal rate and regular rhythm  Pulses: Normal pulses  Pulmonary:      Effort: Pulmonary effort is normal  No respiratory distress  Breath sounds: No wheezing  Abdominal:      General: Bowel sounds are normal  There is no distension  Palpations: Abdomen is soft  Tenderness: There is no abdominal tenderness  Musculoskeletal:      Right lower leg: No edema  Left lower leg: No edema  Skin:     General: Skin is warm and dry  Findings: No erythema  Neurological:      Mental Status: She is alert     Psychiatric:         Mood and Affect: Mood normal           Discussion with Family: Updated  (multiple family members) at bedside  Discharge instructions/Information to patient and family:   See after visit summary for information provided to patient and family  Provisions for Follow-Up Care:  See after visit summary for information related to follow-up care and any pertinent home health orders  Disposition:   Home    Planned Readmission: None     Discharge Statement:  I spent 40 minutes discharging the patient  This time was spent on the day of discharge  I had direct contact with the patient on the day of discharge  Greater than 50% of the total time was spent examining patient, answering all patient questions, arranging and discussing plan of care with patient as well as directly providing post-discharge instructions  Additional time then spent on discharge activities  Discharge Medications:  See after visit summary for reconciled discharge medications provided to patient and/or family        **Please Note: This note may have been constructed using a voice recognition system**

## 2022-11-28 NOTE — CASE MANAGEMENT
Case Management Progress Note    Patient name Malena Navas  Location [facility] 930/[facility] 930-01 MRN [id number]  : 1950 Date 2022       LOS (days): 2  Geometric Mean LOS (GMLOS) (days):   Days to [address]:        OBJECTIVE:        Current admission status: Inpatient  Preferred Pharmacy:   [place] [address] [place] [address]  [id number] [place] [address]  Phone: [phone] Fax: [phone]    [address] [address]  Phone: [phone] Fax: [phone]    Primary Care Provider: Moses Quiroga MD    Primary Insurance: [facility]  Secondary Insurance:     PROGRESS NOTE:    CM was made aware by provider that pt is medically cleared for d/c today w/ no CM needs

## 2022-11-28 NOTE — ASSESSMENT & PLAN NOTE
· K+ 3 1  · Repleted with 40 mEq IV potassium yesterday   · No repeat blood work obtained this AM   · Continue daily 10 mEq potassium supplementation on discharge   · Follow up outpatient and monitor while on HCTZ

## 2022-11-28 NOTE — ASSESSMENT & PLAN NOTE
· UA (+) for blood, leukocytes and occasional bacteria   · Urine cultures negative, mixed contaminants  · D/c further abx on discharge  · Pt is hemodynamically stable without fevers

## 2022-11-30 NOTE — UTILIZATION REVIEW
NOTIFICATION OF ADMISSION DISCHARGE   This is a Notification of Discharge from [address]  Please be advised that this patient has been discharge from our facility  Below you will find the admission and discharge date and time including the patient’s disposition  UTILIZATION REVIEW CONTACT:  Caryn Lloyd  Utilization   Network Utilization Review Department  Phone: [phone] x carefully listen to the prompts  All voicemails are confidential   Email: [email or web address]     ADMISSION INFORMATION  PRESENTATION DATE: 11/26/2022  9:41 PM  OBERVATION ADMISSION DATE:   INPATIENT ADMISSION DATE: 11/26/22  9:41 PM   DISCHARGE DATE: 11/28/2022 10:34 AM   DISPOSITION:Home/Self Care    IMPORTANT INFORMATION:  Send all requests for admission clinical reviews, approved or denied determinations and any other requests to dedicated fax number below belonging to the campus where the patient is receiving treatment   List of dedicated fax numbers:  [address] DENIALS (Administrative/Medical Necessity) [phone]   [address] (Maternity/NICU/Pediatrics) [phone]   [facility] [phone]   [facility] 87 [phone]   [address] [phone]   220 [facility] [phone] 90 [facility] [phone]   [address] [place] [address] 119 [phone]   [facility]  [phone]   4050 [facility] [phone]   412 [facility] 850 [address] [facility] [phone]

## 2022-12-01 ENCOUNTER — APPOINTMENT (OUTPATIENT)
Dept: LAB | Facility: AMBULARY SURGERY CENTER | Age: 72
End: 2022-12-01

## 2022-12-01 DIAGNOSIS — R53.83 FATIGUE, UNSPECIFIED TYPE: ICD-10-CM

## 2022-12-01 DIAGNOSIS — E78.2 MIXED HYPERLIPIDEMIA: ICD-10-CM

## 2022-12-01 LAB
ALBUMIN SERPL BCP-MCNC: 3.2 G/DL (ref 3.5–5)
ALP SERPL-CCNC: 65 U/L (ref 46–116)
ALT SERPL W P-5'-P-CCNC: 29 U/L (ref 12–78)
ANION GAP SERPL CALCULATED.3IONS-SCNC: 5 MMOL/L (ref 4–13)
AST SERPL W P-5'-P-CCNC: 19 U/L (ref 5–45)
BASOPHILS # BLD AUTO: 0.04 THOUSANDS/ÂΜL (ref 0–0.1)
BASOPHILS NFR BLD AUTO: 1 % (ref 0–1)
BILIRUB SERPL-MCNC: 0.34 MG/DL (ref 0.2–1)
BUN SERPL-MCNC: 10 MG/DL (ref 5–25)
CALCIUM ALBUM COR SERPL-MCNC: 10.3 MG/DL (ref 8.3–10.1)
CALCIUM SERPL-MCNC: 9.7 MG/DL (ref 8.3–10.1)
CHLORIDE SERPL-SCNC: 106 MMOL/L (ref 96–108)
CHOLEST SERPL-MCNC: 218 MG/DL
CO2 SERPL-SCNC: 25 MMOL/L (ref 21–32)
CREAT SERPL-MCNC: 0.92 MG/DL (ref 0.6–1.3)
EOSINOPHIL # BLD AUTO: 0.4 THOUSAND/ÂΜL (ref 0–0.61)
EOSINOPHIL NFR BLD AUTO: 6 % (ref 0–6)
ERYTHROCYTE [DISTWIDTH] IN BLOOD BY AUTOMATED COUNT: 15.9 % (ref 11.6–15.1)
GFR SERPL CREATININE-BSD FRML MDRD: 61 ML/MIN/1.73SQ M
GLUCOSE P FAST SERPL-MCNC: 96 MG/DL (ref 65–99)
HCT VFR BLD AUTO: 40.5 % (ref 34.8–46.1)
HDLC SERPL-MCNC: 57 MG/DL
HGB BLD-MCNC: 12.4 G/DL (ref 11.5–15.4)
IMM GRANULOCYTES # BLD AUTO: 0.02 THOUSAND/UL (ref 0–0.2)
IMM GRANULOCYTES NFR BLD AUTO: 0 % (ref 0–2)
LDLC SERPL CALC-MCNC: 127 MG/DL (ref 0–100)
LYMPHOCYTES # BLD AUTO: 2.38 THOUSANDS/ÂΜL (ref 0.6–4.47)
LYMPHOCYTES NFR BLD AUTO: 35 % (ref 14–44)
MCH RBC QN AUTO: 24.9 PG (ref 26.8–34.3)
MCHC RBC AUTO-ENTMCNC: 30.6 G/DL (ref 31.4–37.4)
MCV RBC AUTO: 82 FL (ref 82–98)
MONOCYTES # BLD AUTO: 0.63 THOUSAND/ÂΜL (ref 0.17–1.22)
MONOCYTES NFR BLD AUTO: 9 % (ref 4–12)
NEUTROPHILS # BLD AUTO: 3.25 THOUSANDS/ÂΜL (ref 1.85–7.62)
NEUTS SEG NFR BLD AUTO: 49 % (ref 43–75)
NONHDLC SERPL-MCNC: 161 MG/DL
NRBC BLD AUTO-RTO: 0 /100 WBCS
PLATELET # BLD AUTO: 354 THOUSANDS/UL (ref 149–390)
PMV BLD AUTO: 11.5 FL (ref 8.9–12.7)
POTASSIUM SERPL-SCNC: 3.8 MMOL/L (ref 3.5–5.3)
PROT SERPL-MCNC: 7.8 G/DL (ref 6.4–8.4)
RBC # BLD AUTO: 4.97 MILLION/UL (ref 3.81–5.12)
SODIUM SERPL-SCNC: 136 MMOL/L (ref 135–147)
T3 SERPL-MCNC: 1.1 NG/ML (ref 0.6–1.8)
T4 FREE SERPL-MCNC: 1.29 NG/DL (ref 0.76–1.46)
TRIGL SERPL-MCNC: 168 MG/DL
TSH SERPL DL<=0.05 MIU/L-ACNC: 2.6 UIU/ML (ref 0.45–4.5)
WBC # BLD AUTO: 6.72 THOUSAND/UL (ref 4.31–10.16)

## 2022-12-02 NOTE — TELEPHONE ENCOUNTER
Pt's son Julieta Lehman called the office to schedule a hospital f/u   Pt was seen by Dr RIDDLE at the hospital      Please advise on time frame       Julieta Lehman can be reached at [phone]

## 2022-12-06 NOTE — TELEPHONE ENCOUNTER
Pt's son called to set up the patient's stent removal and also wants to know if there are test to be done before hand to see if the stone is still there     Pt's son can be reached at [phone]

## 2022-12-07 NOTE — TELEPHONE ENCOUNTER
-instructions given verbally and mailed  -Urine C&S   3 weeks prior  -patient will avoid any potentially blood thinning medications 7 days prior  -Nieves - on auth tracker 12/7/2022

## 2022-12-07 NOTE — TELEPHONE ENCOUNTER
Pt's son returned call to nurse in regards to the patient       Pt's son Morena Arriola can be reached at [phone]

## 2022-12-07 NOTE — TELEPHONE ENCOUNTER
Call placed to son and spoke with him  He is asking if further imagining is needed to determine location of the stone and if treatment course has changed  I informed him that I will check with provider and contact him with further recommendations

## 2022-12-07 NOTE — TELEPHONE ENCOUNTER
Call placed to patient's son  He did not answer  LMOM asking him to contact the office to review questions he has about imagining  Office number was provided for the son to call back and discuss

## 2022-12-07 NOTE — TELEPHONE ENCOUNTER
Clinical, patient stented by Dr Ruthy Gamboa, son checking if imaging cam be done prior to surgery to check location of stone  Please check with a AP

## 2022-12-07 NOTE — TELEPHONE ENCOUNTER
I left a voice mail message for the patients son, holding 1/5/2023 SLB with Verges  I asked him to call back to confirm

## 2022-12-07 NOTE — TELEPHONE ENCOUNTER
Pt's son called in stating 1/5 is good  He is requesting a call back because he has some questions about the radiology exams that are to be done prior

## 2022-12-08 NOTE — TELEPHONE ENCOUNTER
I spoke with patients son and provided him with the information per Juni Buck  He is going to call [facility] to schedule this CT scan

## 2022-12-08 NOTE — TELEPHONE ENCOUNTER
Last CT scan performed 11/26/2022 for findings of 4 mm right UVJ calculus  Her surgery is scheduled on 1/5/2023  Would recommend CT renal stone study prior to OR to reevaluate for obstructing calculus

## 2022-12-13 ENCOUNTER — TELEPHONE (OUTPATIENT)
Dept: UROLOGY | Facility: MEDICAL CENTER | Age: 72
End: 2022-12-13

## 2022-12-22 ENCOUNTER — HOSPITAL ENCOUNTER (OUTPATIENT)
Dept: CT IMAGING | Facility: HOSPITAL | Age: 72
Discharge: HOME | End: 2022-12-22

## 2022-12-22 DIAGNOSIS — N20.0 NEPHROLITHIASIS: ICD-10-CM

## 2022-12-27 ENCOUNTER — APPOINTMENT (OUTPATIENT)
Dept: LAB | Age: 72
End: 2022-12-27

## 2022-12-27 DIAGNOSIS — N20.1 CALCULUS OF URETER: ICD-10-CM

## 2022-12-27 RX ORDER — METOPROLOL TARTRATE 50 MG/1
50 TABLET, FILM COATED ORAL
COMMUNITY

## 2022-12-27 NOTE — PRE-PROCEDURE INSTRUCTIONS
Pre-Surgery Instructions:   Medication Instructions   • amLODIPine (NORVASC) 5 mg tablet Take night before surgery   • hydrochlorothiazide (HYDRODIURIL) 25 mg tablet Hold day of surgery  • metoprolol tartrate (LOPRESSOR) 50 mg tablet Take night before surgery   • potassium chloride (Klor-Con) 10 mEq tablet Take night before surgery    Pt denies fever, sob, sore throat and cough  Pt/pt son verbalized understanding of shower and med instructions  Pt instructed to stop nsaids and supplements one week prior to surgery

## 2022-12-30 LAB — BACTERIA UR CULT: ABNORMAL

## 2023-01-04 ENCOUNTER — ANESTHESIA EVENT (OUTPATIENT)
Dept: PERIOP | Facility: HOSPITAL | Age: 73
End: 2023-01-04

## 2023-01-04 NOTE — ANESTHESIA PREPROCEDURE EVALUATION
Procedure:  CYSTOSCOPY URETEROSCOPY WITH LITHOTRIPSY HOLMIUM LASER, RETROGRADE PYELOGRAM AND INSERTION STENT URETERAL (Right: Bladder)    Relevant Problems   CARDIO   (+) Primary hypertension      /RENAL   (+) Hydroureteronephrosis      HEMATOLOGY   (+) Anemia   Prev LMA3    Recent labs personally reviewed:  Lab Results   Component Value Date    WBC 6 72 12/01/2022    HGB 12 4 12/01/2022     12/01/2022     Lab Results   Component Value Date    K 3 8 12/01/2022    BUN 10 12/01/2022    CREATININE 0 92 12/01/2022     No results found for: PTT   No results found for: INR    No results found for: HGBA1C           Physical Exam    Airway    Mallampati score: III  TM Distance: >3 FB  Neck ROM: full     Dental   No notable dental hx     Cardiovascular      Pulmonary      Other Findings        Anesthesia Plan  ASA Score- 2     Anesthesia Type- general with ASA Monitors  Additional Monitors:   Airway Plan: LMA  Plan Factors-Exercise tolerance (METS): <4 METS  Chart reviewed  Existing labs reviewed  Patient summary reviewed  Patient is not a current smoker  Induction- intravenous  Postoperative Plan-     Informed Consent- Anesthetic plan and risks discussed with patient  I personally reviewed this patient with the CRNA  Discussed and agreed on the Anesthesia Plan with the CRNA  Ivelisse Lechuga

## 2023-01-05 ENCOUNTER — APPOINTMENT (OUTPATIENT)
Dept: RADIOLOGY | Facility: HOSPITAL | Age: 73
End: 2023-01-05

## 2023-01-05 ENCOUNTER — ANESTHESIA (OUTPATIENT)
Dept: PERIOP | Facility: HOSPITAL | Age: 73
End: 2023-01-05

## 2023-01-05 ENCOUNTER — TELEPHONE (OUTPATIENT)
Dept: GYNECOLOGY | Facility: CLINIC | Age: 73
End: 2023-01-05

## 2023-01-05 ENCOUNTER — TELEPHONE (OUTPATIENT)
Dept: UROLOGY | Facility: CLINIC | Age: 73
End: 2023-01-05

## 2023-01-05 ENCOUNTER — HOSPITAL ENCOUNTER (OUTPATIENT)
Facility: HOSPITAL | Age: 73
Setting detail: OUTPATIENT SURGERY
Discharge: HOME | End: 2023-01-05
Attending: UROLOGY | Admitting: UROLOGY

## 2023-01-05 VITALS
HEART RATE: 62 BPM | WEIGHT: 125 LBS | SYSTOLIC BLOOD PRESSURE: 148 MMHG | BODY MASS INDEX: 26.13 KG/M2 | TEMPERATURE: 97.8 F | OXYGEN SATURATION: 97 % | DIASTOLIC BLOOD PRESSURE: 72 MMHG | RESPIRATION RATE: 17 BRPM

## 2023-01-05 DIAGNOSIS — N20.0 NEPHROLITHIASIS: Primary | ICD-10-CM

## 2023-01-05 DIAGNOSIS — N20.1 CALCULUS OF URETER: ICD-10-CM

## 2023-01-05 DIAGNOSIS — N20.1 RIGHT URETERAL STONE: Primary | ICD-10-CM

## 2023-01-05 DEVICE — INLAY OPTIMA URETERAL STENT W/O GUIDEWIRE
Type: IMPLANTABLE DEVICE | Site: URETER | Status: FUNCTIONAL
Brand: BARD® INLAY OPTIMA® URETERAL STENT

## 2023-01-05 RX ORDER — PHENAZOPYRIDINE HYDROCHLORIDE 100 MG/1
100 TABLET, FILM COATED ORAL
Status: DISCONTINUED | OUTPATIENT
Start: 2023-01-05 | End: 2023-01-05 | Stop reason: HOSPADM

## 2023-01-05 RX ORDER — SODIUM CHLORIDE, SODIUM LACTATE, POTASSIUM CHLORIDE, CALCIUM CHLORIDE 600; 310; 30; 20 MG/100ML; MG/100ML; MG/100ML; MG/100ML
125 INJECTION, SOLUTION INTRAVENOUS CONTINUOUS
Status: DISCONTINUED | OUTPATIENT
Start: 2023-01-05 | End: 2023-01-05 | Stop reason: HOSPADM

## 2023-01-05 RX ORDER — ASCORBIC ACID 125 MG
100 TABLET,CHEWABLE ORAL
COMMUNITY

## 2023-01-05 RX ORDER — ONDANSETRON 2 MG/ML
4 INJECTION INTRAMUSCULAR; INTRAVENOUS ONCE AS NEEDED
Status: DISCONTINUED | OUTPATIENT
Start: 2023-01-05 | End: 2023-01-05 | Stop reason: HOSPADM

## 2023-01-05 RX ORDER — OXYCODONE HYDROCHLORIDE 5 MG/1
5 TABLET ORAL EVERY 4 HOURS PRN
Status: DISCONTINUED | OUTPATIENT
Start: 2023-01-05 | End: 2023-01-05 | Stop reason: HOSPADM

## 2023-01-05 RX ORDER — DICLOFENAC POTASSIUM 50 MG/1
50 TABLET, FILM COATED ORAL 2 TIMES DAILY
Qty: 10 TABLET | Refills: 0 | Status: SHIPPED | OUTPATIENT
Start: 2023-01-05 | End: 2023-01-10

## 2023-01-05 RX ORDER — FENTANYL CITRATE 50 UG/ML
INJECTION, SOLUTION INTRAMUSCULAR; INTRAVENOUS AS NEEDED
Status: DISCONTINUED | OUTPATIENT
Start: 2023-01-05 | End: 2023-01-05

## 2023-01-05 RX ORDER — MAGNESIUM HYDROXIDE 1200 MG/15ML
LIQUID ORAL AS NEEDED
Status: DISCONTINUED | OUTPATIENT
Start: 2023-01-05 | End: 2023-01-05 | Stop reason: HOSPADM

## 2023-01-05 RX ORDER — ONDANSETRON 2 MG/ML
INJECTION INTRAMUSCULAR; INTRAVENOUS AS NEEDED
Status: DISCONTINUED | OUTPATIENT
Start: 2023-01-05 | End: 2023-01-05

## 2023-01-05 RX ORDER — PROPOFOL 10 MG/ML
INJECTION, EMULSION INTRAVENOUS AS NEEDED
Status: DISCONTINUED | OUTPATIENT
Start: 2023-01-05 | End: 2023-01-05

## 2023-01-05 RX ORDER — DIPHENHYDRAMINE HCL 25 MG
12.5 TABLET ORAL EVERY 6 HOURS PRN
Status: DISCONTINUED | OUTPATIENT
Start: 2023-01-05 | End: 2023-01-05 | Stop reason: HOSPADM

## 2023-01-05 RX ORDER — ACETAMINOPHEN 500 MG
500 TABLET ORAL EVERY 6 HOURS
Qty: 20 TABLET | Refills: 0 | Status: SHIPPED | OUTPATIENT
Start: 2023-01-05 | End: 2023-01-10

## 2023-01-05 RX ORDER — LIDOCAINE HYDROCHLORIDE 10 MG/ML
INJECTION, SOLUTION EPIDURAL; INFILTRATION; INTRACAUDAL; PERINEURAL AS NEEDED
Status: DISCONTINUED | OUTPATIENT
Start: 2023-01-05 | End: 2023-01-05

## 2023-01-05 RX ORDER — FENTANYL CITRATE/PF 50 MCG/ML
50 SYRINGE (ML) INJECTION
Status: DISCONTINUED | OUTPATIENT
Start: 2023-01-05 | End: 2023-01-05 | Stop reason: HOSPADM

## 2023-01-05 RX ORDER — ONDANSETRON 2 MG/ML
4 INJECTION INTRAMUSCULAR; INTRAVENOUS EVERY 6 HOURS PRN
Status: DISCONTINUED | OUTPATIENT
Start: 2023-01-05 | End: 2023-01-05 | Stop reason: HOSPADM

## 2023-01-05 RX ORDER — CEFAZOLIN SODIUM 1 G/3ML
INJECTION, POWDER, FOR SOLUTION INTRAMUSCULAR; INTRAVENOUS AS NEEDED
Status: DISCONTINUED | OUTPATIENT
Start: 2023-01-05 | End: 2023-01-05

## 2023-01-05 RX ORDER — SULFAMETHOXAZOLE AND TRIMETHOPRIM 800; 160 MG/1; MG/1
1 TABLET ORAL EVERY 12 HOURS SCHEDULED
Qty: 6 TABLET | Refills: 0 | Status: SHIPPED | OUTPATIENT
Start: 2023-01-05 | End: 2023-01-08

## 2023-01-05 RX ORDER — LIDOCAINE HYDROCHLORIDE 10 MG/ML
0.5 INJECTION, SOLUTION EPIDURAL; INFILTRATION; INTRACAUDAL; PERINEURAL ONCE
Status: COMPLETED | OUTPATIENT
Start: 2023-01-05 | End: 2023-01-05

## 2023-01-05 RX ORDER — HYDROMORPHONE HCL/PF 1 MG/ML
0.5 SYRINGE (ML) INJECTION
Status: DISCONTINUED | OUTPATIENT
Start: 2023-01-05 | End: 2023-01-05 | Stop reason: HOSPADM

## 2023-01-05 RX ORDER — HYDROMORPHONE HCL IN WATER/PF 6 MG/30 ML
0.2 PATIENT CONTROLLED ANALGESIA SYRINGE INTRAVENOUS
Status: DISCONTINUED | OUTPATIENT
Start: 2023-01-05 | End: 2023-01-05 | Stop reason: HOSPADM

## 2023-01-05 RX ORDER — ACETAMINOPHEN 325 MG/1
650 TABLET ORAL EVERY 6 HOURS PRN
Status: DISCONTINUED | OUTPATIENT
Start: 2023-01-05 | End: 2023-01-05 | Stop reason: HOSPADM

## 2023-01-05 RX ORDER — EPHEDRINE SULFATE 50 MG/ML
INJECTION INTRAVENOUS AS NEEDED
Status: DISCONTINUED | OUTPATIENT
Start: 2023-01-05 | End: 2023-01-05

## 2023-01-05 RX ORDER — DEXAMETHASONE SODIUM PHOSPHATE 10 MG/ML
INJECTION, SOLUTION INTRAMUSCULAR; INTRAVENOUS AS NEEDED
Status: DISCONTINUED | OUTPATIENT
Start: 2023-01-05 | End: 2023-01-05

## 2023-01-05 RX ADMIN — PROPOFOL 100 MG: 10 INJECTION, EMULSION INTRAVENOUS at 10:33

## 2023-01-05 RX ADMIN — PROPOFOL 100 MG: 10 INJECTION, EMULSION INTRAVENOUS at 10:32

## 2023-01-05 RX ADMIN — EPHEDRINE SULFATE 10 MG: 50 INJECTION INTRAVENOUS at 10:50

## 2023-01-05 RX ADMIN — FENTANYL CITRATE 50 MCG: 50 INJECTION, SOLUTION INTRAMUSCULAR; INTRAVENOUS at 10:32

## 2023-01-05 RX ADMIN — LIDOCAINE HYDROCHLORIDE 50 MG: 10 INJECTION, SOLUTION EPIDURAL; INFILTRATION; INTRACAUDAL; PERINEURAL at 10:32

## 2023-01-05 RX ADMIN — DEXAMETHASONE SODIUM PHOSPHATE 10 MG: 10 INJECTION, SOLUTION INTRAMUSCULAR; INTRAVENOUS at 10:47

## 2023-01-05 RX ADMIN — ONDANSETRON 4 MG: 2 INJECTION INTRAMUSCULAR; INTRAVENOUS at 10:47

## 2023-01-05 RX ADMIN — CEFAZOLIN 1000 MG: 1 INJECTION, POWDER, FOR SOLUTION INTRAMUSCULAR; INTRAVENOUS at 10:42

## 2023-01-05 RX ADMIN — LIDOCAINE HYDROCHLORIDE 0.5 ML: 10 INJECTION, SOLUTION EPIDURAL; INFILTRATION; INTRACAUDAL; PERINEURAL at 08:36

## 2023-01-05 RX ADMIN — SODIUM CHLORIDE, SODIUM LACTATE, POTASSIUM CHLORIDE, AND CALCIUM CHLORIDE 125 ML/HR: .6; .31; .03; .02 INJECTION, SOLUTION INTRAVENOUS at 08:35

## 2023-01-05 RX ADMIN — FENTANYL CITRATE 50 MCG: 50 INJECTION, SOLUTION INTRAMUSCULAR; INTRAVENOUS at 10:47

## 2023-01-05 NOTE — TELEPHONE ENCOUNTER
Pt  Scheduled pessary fitting    ----- Message from Raleigh Nunez DO sent at 1/4/2023 10:12 AM EST -----  Regarding: FW: Pressure question  Contact: [phone]  If she declines surgery for prolapse and would rather have pessary she can come here for pessary fitting  ----- Message -----  From: Jerson Penny  Sent: 1/4/2023   9:49 AM EST  To: Raleigh Nunez DO  Subject: FW: Pressure question                              ----- Message -----  From: Gela Gutierrez  Sent: 1/3/2023  10:40 AM EST  To: [facility]  Subject: Pressure question                                I am Terese's son asking for Terese  My name is Allen Valentin  My mother is Sia Pate, date of birth 1950 and she is the patient    Thanks

## 2023-01-05 NOTE — OP NOTE
OPERATIVE REPORT  PATIENT NAME: Ella Liu    :  1950  MRN: [id number]  Pt Location: [facility]ST[facility] ROOM 01    SURGERY DATE: 2023    Surgeon(s) and Role:     * Andrea Bull MD - Primary    Preop Diagnosis:  Calculus of ureter [N20 1]    Post-Op Diagnosis Codes:     * Calculus of ureter [N20 1]    Procedure(s) (LRB):  CYSTOSCOPY URETEROSCOPY , RETROGRADE PYELOGRAM AND removeal and reinsertion of  ureteral mstent STENT URETERAL (Right)   Performed today: Cystoscopy, right retrograde pyelography with interpretation, right diagnostic ureteroscopy, right ureteral stent exchange    Specimen(s):  * No specimens in log *    Estimated Blood Loss:   Minimal    Drains:  * No LDAs found *    Anesthesia Type:   General    Operative Indications:  Calculus of ureter [N20 1]      Operative Findings:  The patient is seen to have severe pelvic organ prolapse with prolapse of the cervix and uterus past the introitus  This was reduced with a packing  We were able to perform diagnostic ureteroscopy showing that the patient has passed her distal right ureteral stone  The collecting system was scoped in the upper, middle, and lower pole calyces were examined and no stones placed on a retrograde approach  Placement of a 6 [place] by 28 cm stent was then placed on a string  Please note that a larger stent was used to account for distal displacement of the ureter upon standing or Valsalva maneuvers to keep the proximal curl within the collecting system  Planned dwell time is 5 days    She will see us back in 3 months with imaging and labs prior    Complications:   None    Procedure and Technique:      PROCEDURES PERFORMED:  1) Cystoscopy  2) RIGHT retrograde pyelography with fluoroscopic interpretation  3) RIGHT ureteroscopy (diagnostic)  4) Right ureteral stent placement (6F x 28cm)    SURGEON:  Andrea Bull MD    ASSISTANTS:  none    NOTE:  There were no qualified teaching residents to assist with this case    ANESTHESIA: General     COMPLICATIONS:   None    ANTIBIOTICS:  ancef    INTRAOPERATIVE THROMBOEMBOLISM PROPHYLAXIS:  Pneumatic compression stockings         FINDINGS:    1  The RIGHT calculus was not radiopaque on plain fluoroscopy  2  Retrograde pyelogram was performed on the RIGHT side using a 5 Fr open ended catheter    3  The following findings were noted: Mild hydroureteronephrosis  Mildly dilated calyces  No filling defects  Contrast drained out of the collecting system  INDICATIONS FOR PROCEDURE:  Erendira Machuca is an 74 y o  old female with a right Right ureteral calculus  After discussing the options for treatment, including medical expulsive therapy, extracorporeal shockwave lithotripsy, and ureteroscopy, the patient elected to undergo ureteroscopy and ureteral stent placement  We discussed the procedure in detail, the alternatives, and the risks, and they signed informed consent to proceed (these are outlined in the surgical consent form)  PROCEDURE IN DETAIL:     The patient was identified by name, date of birth, and MRN  and brought to the OR  Antibiotic prophylaxis and DVT prophylaxis were administered as per the guidelines  They were placed in the dorsal lithotomy position with care to pad all pressure points  They were prepped and draped in the usual sterile fashion  A surgical time out was performed with all in the room in agreement with the correct patient, procedure, indications, and laterality  A 21-Egyptian rigid cystoscope was used to enter the bladder  The bladder was inspected in its entirety and there were no lesions noted  The ureteral orifices were identified in their normal orthotopic positions after reduction of significant pelvic organ prolapse with the use of a vaginal packing, later removed  The right ureteral stent was grasped and delivered to the meatus  A wire was placed proximally    A semirigid scope was used to perform ureteroscopy showing no stones or lesions  This was performed to the level of the ureteropelvic junction  A second working wire was then placed and then we placed a flexible scope into the collecting system in the upper, middle, and lower pole calyces were completely scoped x2  Fluoroscopic images were taken of this maneuver  A 6 Icelandic by 28 cm right ureteral stent was then placed on a string  This was secured to the mons pubis  All extremity counts and sponge counts were correct  A postoperative debrief was performed  The patient was placed back into the supine position, awakened from general anesthesia and brought to recovery room in stable condition  ESTIMATED BLOOD LOSS:  Minimal      DRAINS:      SPECIMENS:   * No orders in the log *     IMPLANTS:   Implant Name Type Inv  Item Serial No   Lot No  LRB No  Used Action   STENT URETERAL 6FR 22CM INLAY OPTIMA W/O GW - [id number]  STENT URETERAL 6FR 22CM INLAY OPTIMA W/O GW  [facility] [id number] Right 1 Explanted   STENT URETERAL 6 FR 28CM INLAY OPTIMA - [id number]  STENT URETERAL 6 FR 28CM INLAY OPTIMA  [address] [id number] Right 1 Implanted        COMPLICATIONS: none    DISPOSITION: PACU     PLAN:  The patient did well with her ureteroscopy today  She can remove her stent in 5 days  We will arrange for follow-up in the urology office in 3 months with kidney function testing and a renal ultrasound       I was present for the entire procedure and A qualified resident physician was not available    Patient Disposition:  PACU         SIGNATURE: Sofy Lozano MD  DATE: January 5, 2023  TIME: 11:09 AM

## 2023-01-05 NOTE — DISCHARGE INSTR - AVS FIRST PAGE
Terese,    Today you had cystoscopy with right ureteroscopy  This went well  Indeed, you have passed your distal stone  I am not able to see any other stones within the kidney within the visible part of your collecting system  This is good news and that you do not have any large obstructing stones  It is possible that the calcifications noted on your CT scan are stones that are just not visible from the approach from below  After surgery like this you may have some urgency and frequency of urination along with some blood in the urine  You do have a stent on a string  You will notice a small black thread coming out of the vagina and taped to the front of the vaginal tissue  Please use this string to remove your stent in 5 days  We will have you seen back in the office in 3 months with an ultrasound and kidney function testing prior  You do have severe pelvic organ prolapse with a good bit of your uterus and cervix hanging out of the vagina  Do consider repair of this or wearing a pessary as this can keep your ureters in the long-term and cause troubles and promote stone formation  If you have questions or concerns as you recover, please let me know  I hope that you feel better soon    Do enjoy the wedding, congratulations,  Dr Yelitza Worley

## 2023-01-05 NOTE — TELEPHONE ENCOUNTER
Patient is status post diagnostic ureteroscopy  Initial stent placed by Dr Dominique Chang and has been removed  Please update her stent database  The patient has a new, 6 [place] by 28 cm right ureteral stent on a string  Please have her remove this in 5 days    She can see us in 3 months with imaging and labs prior, these have been ordered

## 2023-01-05 NOTE — H&P
H&P Exam - Urology   Terese Herrera 74 y o  female MRN: [id number]  Unit/Bed#: OR [place] Encounter: [id number]    Assessment/Plan     Assessment:  40-year-old woman with history of right ureteral stent placement for hydronephrosis due to nephrolithiasis  Here today for ureteroscopy with laser lithotripsy and allocated procedures  I did  her on the importance of treating her prolapse at some point either by way of a pessary or consideration of pelvic reconstructive surgery  She was marked on her right arm  Plan:  Proceed to right ureteroscopy with laser lithotripsy and allocated procedures    History of Present Illness   HPI:  Fabiola Martinez is a 74 y o  female who presents with right ureteral stone status post stenting some weeks ago  Here today for definitive stone management  No change in her state of health  No new complaints  The following portions of the patient's history were reviewed and updated as appropriate: allergies, current medications, past family history, past medical history, past social history, past surgical history and problem list     Review of Systems   Constitutional: Negative  HENT: Negative  Eyes: Negative  Respiratory: Negative  Cardiovascular: Negative  Gastrointestinal: Negative  Endocrine: Negative  Genitourinary: Negative  As per HPI   Musculoskeletal: Negative  Skin: Negative  Allergic/Immunologic: Negative  Neurological: Negative  Hematological: Negative  Psychiatric/Behavioral: Negative          Historical Information   Past Medical History:   Diagnosis Date   • Hypertension      Past Surgical History:   Procedure Laterality Date   • APPENDECTOMY     • CHOLECYSTECTOMY     • FL RETROGRADE PYELOGRAM  11/27/2022   • MA CYSTO BLADDER W/URETERAL CATHETERIZATION Right 11/27/2022    Procedure: CYSTOSCOPY RETROGRADE PYELOGRAM WITH INSERTION STENT URETERAL;  Surgeon: Clayton Darby MD;  Location: [facility]; Service: Urology     Social History   Social History     Substance and Sexual Activity   Alcohol Use Not Currently     Social History     Substance and Sexual Activity   Drug Use Never     Social History     Tobacco Use   Smoking Status Former   • Years: 20 00   • Types: Cigarettes   • Start date: 63   • Quit date: 12   • Years since quittin 0   Smokeless Tobacco Never     E-Cigarette/Vaping   • E-Cigarette Use Never User      E-Cigarette/Vaping Substances   • Nicotine No    • THC No    • CBD No    • Flavoring No    • Other No    • Unknown No      Family History: non-contributory    Meds/Allergies   all medications and allergies reviewed  No Known Allergies    Objective   Vitals: Blood pressure 154/87, pulse 59, temperature 97 7 °F (36 5 °C), temperature source Temporal, resp  rate 18, weight 56 7 kg (125 lb), SpO2 96 %  No intake/output data recorded  Invasive Devices     Peripheral Intravenous Line  Duration           Peripheral IV 23 Left Arm <1 day                Physical Exam  Vitals reviewed  Constitutional:       General: She is not in acute distress  Appearance: Normal appearance  She is not ill-appearing, toxic-appearing or diaphoretic  HENT:      Head: Normocephalic and atraumatic  Eyes:      General: No scleral icterus  Right eye: No discharge  Left eye: No discharge  Cardiovascular:      Rate and Rhythm: Normal rate and regular rhythm  Pulses: Normal pulses  Heart sounds: Normal heart sounds  No murmur heard  No friction rub  No gallop  Pulmonary:      Effort: Pulmonary effort is normal  No respiratory distress  Breath sounds: Normal breath sounds  No stridor  No wheezing or rhonchi  Abdominal:      General: There is no distension  Genitourinary:     Comments: Deferred for OR  Musculoskeletal:         General: No swelling  Skin:     Coloration: Skin is not jaundiced  Neurological:      General: No focal deficit present  Mental Status: She is alert  Cranial Nerves: No cranial nerve deficit  Psychiatric:         Mood and Affect: Mood normal          Behavior: Behavior normal          Thought Content: Thought content normal          Judgment: Judgment normal          Lab Results: I have personally reviewed pertinent reports  Imaging: I have personally reviewed pertinent reports  EKG, Pathology, and Other Studies: I have personally reviewed pertinent reports  VTE Prophylaxis: Sequential compression device Gabi Kaiser)     Code Status: Prior  Advance Directive and Living Will:      Power of :    POLST:      Counseling / Coordination of Care  Total floor / unit time spent today 15 minutes  Greater than 50% of total time was spent with the patient and / or family counseling and / or coordination of care  A description of the counseling / coordination of care: Review of today's case

## 2023-01-05 NOTE — ANESTHESIA POSTPROCEDURE EVALUATION
Post-Op Assessment Note    CV Status:  Stable    Pain management: adequate     Mental Status:  Alert and awake   Hydration Status:  Euvolemic   PONV Controlled:  Controlled   Airway Patency:  Patent      Post Op Vitals Reviewed: Yes      Staff: Anesthesiologist         No notable events documented      BP   126/61   Temp   97   Pulse  64   Resp   16   SpO2   96

## 2023-01-09 ENCOUNTER — HOSPITAL ENCOUNTER (OUTPATIENT)
Dept: RADIOLOGY | Facility: HOSPITAL | Age: 73
Discharge: HOME | End: 2023-01-09
Attending: UROLOGY

## 2023-01-09 DIAGNOSIS — N20.0 NEPHROLITHIASIS: ICD-10-CM

## 2023-01-09 NOTE — TELEPHONE ENCOUNTER
Patient states she found the string for the stent on the floor, and would like to know what she needs to do next  Please contact Gilmer CARMICHAEL) @ [phone]

## 2023-01-09 NOTE — TELEPHONE ENCOUNTER
Is she sure stent didn't come out with string? If there is a question we can get a KUB for confirmation  Otherwise would need a cystoscopy and stent removal in the office

## 2023-01-09 NOTE — TELEPHONE ENCOUNTER
Spoke with patient's son, Karine Steward who states he had seen the string on the floor of shower  No stent attached to it  Informed Karineleena Steward patient will need to go for KUB  Order placed in chart  Stent was supposed to be removed tomorrow  A cysto stent removal was scheduled for patient just in case it is needed  It is scheduled for 1/22/23 @ [address]  This can be cancelled if KUB confirms stent was removed  Karine Steward verbalized understanding and agrees to plan

## 2023-01-11 ENCOUNTER — PROCEDURE VISIT (OUTPATIENT)
Dept: UROLOGY | Facility: CLINIC | Age: 73
End: 2023-01-11

## 2023-01-11 ENCOUNTER — TELEPHONE (OUTPATIENT)
Dept: OTHER | Facility: OTHER | Age: 73
End: 2023-01-11

## 2023-01-11 VITALS
WEIGHT: 125 LBS | HEIGHT: 58 IN | SYSTOLIC BLOOD PRESSURE: 142 MMHG | BODY MASS INDEX: 26.24 KG/M2 | DIASTOLIC BLOOD PRESSURE: 78 MMHG

## 2023-01-11 DIAGNOSIS — Z96.0 RETAINED URETERAL STENT: Primary | ICD-10-CM

## 2023-01-11 RX ORDER — CEPHALEXIN 500 MG/1
500 CAPSULE ORAL EVERY 8 HOURS SCHEDULED
Qty: 9 CAPSULE | Refills: 0 | Status: SHIPPED | OUTPATIENT
Start: 2023-01-11 | End: 2023-01-14

## 2023-01-11 NOTE — TELEPHONE ENCOUNTER
Patients son is calling in to confirm all is ok with his mothers recent xray before he brings her in for her appointment today 1/11/23 at 2:00pm   Any questions, please call him back

## 2023-01-11 NOTE — PROGRESS NOTES
Cystoscopy     Date/Time 1/11/2023 2:00 PM     Performed by  Marcia Christensen PA-C     Authorized by Marcia Christensen PA-C      Universal Protocol:  Consent: Written consent obtained  Consent given by: patient  Time out: Immediately prior to procedure a "time out" was called to verify the correct patient, procedure, equipment, support staff and site/side marked as required  Timeout called at: 1/11/2023 2:00 PM   Patient understanding: patient states understanding of the procedure being performed  Patient identity confirmed: verbally with patient        Procedure Details:  Procedure type: simple removal of a foreign body, stone, or stent    Additional Procedure Details: Female history of right ureteral stone  She initially had a stent placed and then the stone was lasered and removed December 5  She had a stent with a string initially placed  However the string became dislodged with the stent remaining and this was confirmed by KUB  She is here for stent removal   Patient is placed in a dorsolithotomy position  The groin is prepped and draped in the usual fashion  Prolapse is noted and placed back in the vaginal vault and  held in place with a sponge  The flexible cystoscope was passed through the now easily identified urethra the bladder  After adequate filling inspection the mucosa revealed no abnormalities  Stent is identified emanating from the right ureteral orifice  The forceps and removed without difficulty  Vaginal sponge was removed  Patient is made aware of postprocedure care  Signs and symptoms of post stent removal that may occur and understands  Prophylactic antibiotics were given x3 days  She will follow-up in 6 weeks with an ultrasound prior to visit

## 2023-01-25 ENCOUNTER — PROCEDURE VISIT (OUTPATIENT)
Dept: GYNECOLOGY | Facility: CLINIC | Age: 73
End: 2023-01-25

## 2023-01-25 VITALS
WEIGHT: 130 LBS | HEART RATE: 69 BPM | SYSTOLIC BLOOD PRESSURE: 128 MMHG | HEIGHT: 58 IN | BODY MASS INDEX: 27.29 KG/M2 | DIASTOLIC BLOOD PRESSURE: 80 MMHG

## 2023-01-25 DIAGNOSIS — N81.3 UTEROVAGINAL PROLAPSE, COMPLETE: Primary | ICD-10-CM

## 2023-01-25 NOTE — PROGRESS NOTES
Assessment/Plan:       Diagnoses and all orders for this visit:    Uterovaginal prolapse, complete; fit today with a #5 ring pessary  She will return to the office in 3 to 4 months for pessary check        Subjective:      Patient ID: Beverly Edmond is a 74 y o  female  HPI patient is para 3 with 3 spontaneous vaginal deliveries seen in the office as a referral from Martha Oshea on June 1  She was referred secondary to uterine prolapse and urinary incontinence  After examination she was found to have very complete uterovaginal prolapse  We discussed options at that time and at that time she had opted to proceed with surgical management  I referred her to urogynecology for this procedure  The patient has changed her mind and would like to proceed with pessary fitting  The following portions of the patient's history were reviewed and updated as appropriate:   She  has a past medical history of Hypertension  She   Patient Active Problem List    Diagnosis Date Noted   • Right ureteral stone 01/05/2023   • Anemia 11/28/2022   • Hydroureteronephrosis 11/26/2022   • Abnormal urinalysis 11/26/2022   • Hypokalemia 11/26/2022   • Primary hypertension 05/26/2022   • Dislocation of right shoulder joint 04/28/2020     She  has a past surgical history that includes Appendectomy; FL retrograde pyelogram (11/27/2022); pr cysto bladder w/ureteral catheterization (Right, 11/27/2022); Cholecystectomy; pr cysto/uretero w/lithotripsy &indwell stent insrt (Right, 1/5/2023); and FL retrograde pyelogram (1/5/2023)  Her family history includes Hypertension in her mother  She  reports that she quit smoking about 37 years ago  Her smoking use included cigarettes  She started smoking about 58 years ago  She has never used smokeless tobacco  She reports that she does not currently use alcohol  She reports that she does not use drugs    Current Outpatient Medications   Medication Sig Dispense Refill   • amLODIPine (NORVASC) 5 mg tablet Take 5 mg by mouth daily at bedtime     • Cholecalciferol (Vitamin D3) 125 MCG (5000 UT) TABS Take 5,000 Units by mouth daily     • diclofenac potassium (CATAFLAM) 50 mg tablet Take 1 tablet (50 mg total) by mouth 2 (two) times a day for 5 days 10 tablet 0   • hydrochlorothiazide (HYDRODIURIL) 25 mg tablet Take 25 mg by mouth daily     • Menaquinone-7 (Vitamin K2) 100 MCG CAPS Take 100 mcg by mouth Daily at 2am     • metoprolol tartrate (LOPRESSOR) 50 mg tablet Take 50 mg by mouth daily at bedtime     • potassium chloride (Klor-Con) 10 mEq tablet Take 10 mEq by mouth daily at bedtime       No current facility-administered medications for this visit  Current Outpatient Medications on File Prior to Visit   Medication Sig   • amLODIPine (NORVASC) 5 mg tablet Take 5 mg by mouth daily at bedtime   • Cholecalciferol (Vitamin D3) 125 MCG (5000 UT) TABS Take 5,000 Units by mouth daily   • diclofenac potassium (CATAFLAM) 50 mg tablet Take 1 tablet (50 mg total) by mouth 2 (two) times a day for 5 days   • hydrochlorothiazide (HYDRODIURIL) 25 mg tablet Take 25 mg by mouth daily   • Menaquinone-7 (Vitamin K2) 100 MCG CAPS Take 100 mcg by mouth Daily at 2am   • metoprolol tartrate (LOPRESSOR) 50 mg tablet Take 50 mg by mouth daily at bedtime   • potassium chloride (Klor-Con) 10 mEq tablet Take 10 mEq by mouth daily at bedtime     No current facility-administered medications on file prior to visit  She has No Known Allergies       Review of Systems      Objective:      /80   Pulse 69   Ht 4' 10" (1 473 m)   Wt 59 kg (130 lb)   BMI 27 17 kg/m²          Physical Exam  Vitals reviewed  Abdominal:      General: There is no distension  Palpations: Abdomen is soft  There is no mass  Tenderness: There is no abdominal tenderness  There is no guarding or rebound  Hernia: No hernia is present  There is no hernia in the left inguinal area or right inguinal area     Genitourinary: General: Normal vulva  Labia:         Right: No rash, tenderness or lesion  Left: No rash, tenderness or lesion  Comments: Complete uterovaginal prolapse/procidentia  No palp adnexal masses  Vagina with atrophic changes  Urethral orifice normal   Bartholin's and [place]'s glands normal   Patient was fitted with a #5 ring pessary  Lymphadenopathy:      Lower Body: No right inguinal adenopathy  No left inguinal adenopathy

## 2023-04-24 ENCOUNTER — APPOINTMENT (OUTPATIENT)
Dept: LAB | Facility: AMBULARY SURGERY CENTER | Age: 73
End: 2023-04-24
Attending: UROLOGY

## 2023-04-24 DIAGNOSIS — N20.0 NEPHROLITHIASIS: ICD-10-CM

## 2023-04-24 LAB
ANION GAP SERPL CALCULATED.3IONS-SCNC: 5 MMOL/L (ref 4–13)
BUN SERPL-MCNC: 19 MG/DL (ref 5–25)
CALCIUM SERPL-MCNC: 9.6 MG/DL (ref 8.3–10.1)
CHLORIDE SERPL-SCNC: 107 MMOL/L (ref 96–108)
CO2 SERPL-SCNC: 26 MMOL/L (ref 21–32)
CREAT SERPL-MCNC: 0.98 MG/DL (ref 0.6–1.3)
GFR SERPL CREATININE-BSD FRML MDRD: 57 ML/MIN/1.73SQ M
GLUCOSE P FAST SERPL-MCNC: 94 MG/DL (ref 65–99)
POTASSIUM SERPL-SCNC: 3.3 MMOL/L (ref 3.5–5.3)
SODIUM SERPL-SCNC: 138 MMOL/L (ref 135–147)

## 2023-04-26 ENCOUNTER — OFFICE VISIT (OUTPATIENT)
Dept: GYNECOLOGY | Facility: CLINIC | Age: 73
End: 2023-04-26

## 2023-04-26 VITALS
DIASTOLIC BLOOD PRESSURE: 76 MMHG | SYSTOLIC BLOOD PRESSURE: 120 MMHG | HEART RATE: 81 BPM | WEIGHT: 130 LBS | HEIGHT: 58 IN | BODY MASS INDEX: 27.29 KG/M2

## 2023-04-26 DIAGNOSIS — N81.3 UTEROVAGINAL PROLAPSE, COMPLETE: Primary | ICD-10-CM

## 2023-04-26 NOTE — PROGRESS NOTES
Patient presents the office today for pessary check  She is para 3 with 3 spontaneous vaginal deliveries  She had first been seen in June as a referral from [address] [place] [address] secondary to uterine prolapse and urinary incontinence  Examination revealed a complete uterovaginal prolapse  We discussed options with her at that time and she had originally elected to proceed with surgery  After further consideration she had decided to proceed with a pessary fitting  She was fit for pessary on January 25  She is doing well with the pessary in place  She denies any vaginal discharge or bleeding  Denies any dysuria, urgency urinary incontinence or difficulty starting a stream   No GI complaints  Physical exam: Complete uterovaginal prolapse  Uterus is normal size and contour  No palpable adnexal masses or tenderness  Vagina with atrophic changes  Bartholin's and [place]'s glands normal   Urethral orifice normal   Positive cystocele and rectocele  No vaginal ulcerations noted  The pessary was removed cleansed and reinserted      Impression: Uterovaginal prolapse    Plan: Return to the office in 4 to 5 months for repeat pessary check

## 2023-08-28 ENCOUNTER — OFFICE VISIT (OUTPATIENT)
Dept: GYNECOLOGY | Facility: CLINIC | Age: 73
End: 2023-08-28
Payer: COMMERCIAL

## 2023-08-28 VITALS
WEIGHT: 133 LBS | SYSTOLIC BLOOD PRESSURE: 160 MMHG | DIASTOLIC BLOOD PRESSURE: 80 MMHG | BODY MASS INDEX: 27.8 KG/M2 | HEART RATE: 69 BPM

## 2023-08-28 DIAGNOSIS — N81.3 COMPLETE UTERINE PROLAPSE: Primary | ICD-10-CM

## 2023-08-28 PROCEDURE — 99213 OFFICE O/P EST LOW 20 MIN: CPT | Performed by: OBSTETRICS & GYNECOLOGY

## 2023-08-28 NOTE — PROGRESS NOTES
Assessment/Plan:         Diagnoses and all orders for this visit:    Complete uterine prolapse; return to the office in 4 to 5 months for pessary check        Subjective:      Patient ID: Amber Small is a 74 y.o. female. HPI patient presents for pessary check. She was fit with a #5 ring pessary in January 2023. She is doing well with the pessary in place with no complaints. She is voiding well. No difficulty with bowel movements. No abdominal pain. Denies any vaginal discharge or bleeding. The following portions of the patient's history were reviewed and updated as appropriate:   She  has a past medical history of Hypertension and Urinary incontinence (10/10/2019). She   Patient Active Problem List    Diagnosis Date Noted   • Right ureteral stone 01/05/2023   • Anemia 11/28/2022   • Hydroureteronephrosis 11/26/2022   • Abnormal urinalysis 11/26/2022   • Hypokalemia 11/26/2022   • Primary hypertension 05/26/2022   • Dislocation of right shoulder joint 04/28/2020     She  has a past surgical history that includes Appendectomy; FL retrograde pyelogram (11/27/2022); pr cysto bladder w/ureteral catheterization (Right, 11/27/2022); Cholecystectomy; pr cysto/uretero w/lithotripsy &indwell stent insrt (Right, 01/05/2023); FL retrograde pyelogram (01/05/2023); and Tubal ligation (5/20/1980). Her family history includes Hypertension in her mother. She  reports that she quit smoking about 21 years ago. Her smoking use included cigarettes. She started smoking about 59 years ago. She has a 30.00 pack-year smoking history. She has never used smokeless tobacco. She reports that she does not currently use alcohol. She reports that she does not use drugs.   Current Outpatient Medications   Medication Sig Dispense Refill   • amLODIPine (NORVASC) 5 mg tablet Take 5 mg by mouth daily at bedtime     • Cholecalciferol (Vitamin D3) 125 MCG (5000 UT) TABS Take 5,000 Units by mouth daily     • hydrochlorothiazide (HYDRODIURIL) 25 mg tablet Take 25 mg by mouth daily     • Menaquinone-7 (Vitamin K2) 100 MCG CAPS Take 100 mcg by mouth Daily at 2am     • potassium chloride (Klor-Con) 10 mEq tablet Take 10 mEq by mouth daily at bedtime     • diclofenac potassium (CATAFLAM) 50 mg tablet Take 1 tablet (50 mg total) by mouth 2 (two) times a day for 5 days 10 tablet 0     No current facility-administered medications for this visit. Current Outpatient Medications on File Prior to Visit   Medication Sig   • amLODIPine (NORVASC) 5 mg tablet Take 5 mg by mouth daily at bedtime   • Cholecalciferol (Vitamin D3) 125 MCG (5000 UT) TABS Take 5,000 Units by mouth daily   • hydrochlorothiazide (HYDRODIURIL) 25 mg tablet Take 25 mg by mouth daily   • Menaquinone-7 (Vitamin K2) 100 MCG CAPS Take 100 mcg by mouth Daily at 2am   • potassium chloride (Klor-Con) 10 mEq tablet Take 10 mEq by mouth daily at bedtime   • diclofenac potassium (CATAFLAM) 50 mg tablet Take 1 tablet (50 mg total) by mouth 2 (two) times a day for 5 days     No current facility-administered medications on file prior to visit. She has No Known Allergies. .    Review of Systems      Objective:      /80   Pulse 69   Wt 60.3 kg (133 lb)   BMI 27.80 kg/m²          Physical Exam  Vitals reviewed. Abdominal:      General: There is no distension. Palpations: Abdomen is soft. There is no mass. Tenderness: There is no abdominal tenderness. There is no guarding or rebound. Hernia: No hernia is present. There is no hernia in the left inguinal area or right inguinal area. Genitourinary:     General: Normal vulva. Labia:         Right: No rash, tenderness or lesion. Left: No rash, tenderness or lesion. Vagina: Normal.      Cervix: Normal.      Uterus: With uterine prolapse. Adnexa:         Right: No mass, tenderness or fullness. Left: No mass, tenderness or fullness.         Comments: Bartholin's and [place]'s glands normal. Urethral orifice normal.  Pessary removed cleansed and reinserted  Lymphadenopathy:      Lower Body: No right inguinal adenopathy. No left inguinal adenopathy. Neurological:      Mental Status: She is alert.

## 2024-01-17 NOTE — PROGRESS NOTES
"Assessment/Plan:    Will continue pessary and RTO in 3 months. Advised to call the office with any bleeding.     Diagnoses and all orders for this visit:    Uterovaginal prolapse, incomplete        Subjective:      Patient ID: Terese Terry is a 73 y.o. female.    Pt presents for pessary check. She has a #5 ring pessary for UV prolapse.   She denies VB, discharge. Has normal bowel and bladder function.         The following portions of the patient's history were reviewed and updated as appropriate: allergies, current medications, past family history, past medical history, past social history, past surgical history and problem list.    Review of Systems   Constitutional: Negative.    Respiratory: Negative.     Cardiovascular: Negative.    Gastrointestinal: Negative.    Endocrine: Negative.    Genitourinary:  Negative for dysuria, frequency, pelvic pain, urgency, vaginal bleeding, vaginal discharge and vaginal pain.   Musculoskeletal: Negative.    Skin: Negative.    Neurological: Negative.    Psychiatric/Behavioral: Negative.           Objective:      /80   Pulse 82   Ht 4' 10\" (1.473 m)   Wt 58.5 kg (129 lb)   BMI 26.96 kg/m²          Physical Exam  Vitals and nursing note reviewed. Exam conducted with a chaperone present.   Constitutional:       Appearance: Normal appearance.   HENT:      Head: Normocephalic and atraumatic.   Pulmonary:      Effort: Pulmonary effort is normal.   Abdominal:      Hernia: There is no hernia in the left inguinal area or right inguinal area.   Genitourinary:     General: Normal vulva.      Exam position: Supine.      Pubic Area: No rash.       Labia:         Right: No rash, tenderness, lesion or injury.         Left: No rash, tenderness, lesion or injury.       Urethra: No urethral pain, urethral swelling or urethral lesion.      Vagina: No signs of injury and foreign body. Prolapsed vaginal walls present. No vaginal discharge, erythema (no ulceration), tenderness, bleeding " or lesions.      Comments: Pessary removed, cleaned and reinserted  Musculoskeletal:         General: Normal range of motion.      Cervical back: Normal range of motion.   Lymphadenopathy:      Lower Body: No right inguinal adenopathy. No left inguinal adenopathy.   Skin:     General: Skin is warm and dry.   Neurological:      Mental Status: She is alert and oriented to person, place, and time.   Psychiatric:         Mood and Affect: Mood normal.         Behavior: Behavior normal.         Thought Content: Thought content normal.         Judgment: Judgment normal.

## 2024-01-23 ENCOUNTER — OFFICE VISIT (OUTPATIENT)
Dept: OBGYN CLINIC | Facility: CLINIC | Age: 74
End: 2024-01-23
Payer: COMMERCIAL

## 2024-01-23 VITALS
SYSTOLIC BLOOD PRESSURE: 162 MMHG | HEART RATE: 82 BPM | HEIGHT: 58 IN | DIASTOLIC BLOOD PRESSURE: 80 MMHG | BODY MASS INDEX: 27.08 KG/M2 | WEIGHT: 129 LBS

## 2024-01-23 DIAGNOSIS — N81.2 UTEROVAGINAL PROLAPSE, INCOMPLETE: Primary | ICD-10-CM

## 2024-01-23 PROCEDURE — 99212 OFFICE O/P EST SF 10 MIN: CPT | Performed by: OBSTETRICS & GYNECOLOGY

## 2024-05-29 ENCOUNTER — APPOINTMENT (OUTPATIENT)
Dept: LAB | Facility: AMBULARY SURGERY CENTER | Age: 74
End: 2024-05-29
Payer: COMMERCIAL

## 2024-05-29 DIAGNOSIS — I10 ESSENTIAL HYPERTENSION, MALIGNANT: ICD-10-CM

## 2024-05-29 DIAGNOSIS — E78.5 HYPERLIPIDEMIA, UNSPECIFIED HYPERLIPIDEMIA TYPE: ICD-10-CM

## 2024-05-29 LAB
ALBUMIN SERPL BCP-MCNC: 3.9 G/DL (ref 3.5–5)
ALP SERPL-CCNC: 38 U/L (ref 34–104)
ALT SERPL W P-5'-P-CCNC: 11 U/L (ref 7–52)
ANION GAP SERPL CALCULATED.3IONS-SCNC: 9 MMOL/L (ref 4–13)
AST SERPL W P-5'-P-CCNC: 14 U/L (ref 13–39)
BACTERIA UR QL AUTO: ABNORMAL /HPF
BASOPHILS # BLD AUTO: 0.03 THOUSANDS/ÂΜL (ref 0–0.1)
BASOPHILS NFR BLD AUTO: 1 % (ref 0–1)
BILIRUB SERPL-MCNC: 0.44 MG/DL (ref 0.2–1)
BILIRUB UR QL STRIP: NEGATIVE
BUN SERPL-MCNC: 21 MG/DL (ref 5–25)
CALCIUM SERPL-MCNC: 9.8 MG/DL (ref 8.4–10.2)
CHLORIDE SERPL-SCNC: 105 MMOL/L (ref 96–108)
CHOLEST SERPL-MCNC: 249 MG/DL
CLARITY UR: CLEAR
CO2 SERPL-SCNC: 29 MMOL/L (ref 21–32)
COLOR UR: ABNORMAL
CREAT SERPL-MCNC: 1.06 MG/DL (ref 0.6–1.3)
EOSINOPHIL # BLD AUTO: 0.28 THOUSAND/ÂΜL (ref 0–0.61)
EOSINOPHIL NFR BLD AUTO: 5 % (ref 0–6)
ERYTHROCYTE [DISTWIDTH] IN BLOOD BY AUTOMATED COUNT: 15.3 % (ref 11.6–15.1)
GFR SERPL CREATININE-BSD FRML MDRD: 51 ML/MIN/1.73SQ M
GLUCOSE P FAST SERPL-MCNC: 94 MG/DL (ref 65–99)
GLUCOSE UR STRIP-MCNC: NEGATIVE MG/DL
HCT VFR BLD AUTO: 41.5 % (ref 34.8–46.1)
HDLC SERPL-MCNC: 70 MG/DL
HGB BLD-MCNC: 12.9 G/DL (ref 11.5–15.4)
HGB UR QL STRIP.AUTO: NEGATIVE
IMM GRANULOCYTES # BLD AUTO: 0.01 THOUSAND/UL (ref 0–0.2)
IMM GRANULOCYTES NFR BLD AUTO: 0 % (ref 0–2)
KETONES UR STRIP-MCNC: NEGATIVE MG/DL
LDLC SERPL CALC-MCNC: 159 MG/DL (ref 0–100)
LEUKOCYTE ESTERASE UR QL STRIP: ABNORMAL
LYMPHOCYTES # BLD AUTO: 2.09 THOUSANDS/ÂΜL (ref 0.6–4.47)
LYMPHOCYTES NFR BLD AUTO: 35 % (ref 14–44)
MCH RBC QN AUTO: 25.5 PG (ref 26.8–34.3)
MCHC RBC AUTO-ENTMCNC: 31.1 G/DL (ref 31.4–37.4)
MCV RBC AUTO: 82 FL (ref 82–98)
MONOCYTES # BLD AUTO: 0.58 THOUSAND/ÂΜL (ref 0.17–1.22)
MONOCYTES NFR BLD AUTO: 10 % (ref 4–12)
NEUTROPHILS # BLD AUTO: 2.91 THOUSANDS/ÂΜL (ref 1.85–7.62)
NEUTS SEG NFR BLD AUTO: 49 % (ref 43–75)
NITRITE UR QL STRIP: NEGATIVE
NON-SQ EPI CELLS URNS QL MICRO: ABNORMAL /HPF
NONHDLC SERPL-MCNC: 179 MG/DL
NRBC BLD AUTO-RTO: 0 /100 WBCS
PH UR STRIP.AUTO: 6 [PH]
PLATELET # BLD AUTO: 269 THOUSANDS/UL (ref 149–390)
PMV BLD AUTO: 11.2 FL (ref 8.9–12.7)
POTASSIUM SERPL-SCNC: 3.7 MMOL/L (ref 3.5–5.3)
PROT SERPL-MCNC: 7.2 G/DL (ref 6.4–8.4)
PROT UR STRIP-MCNC: ABNORMAL MG/DL
RBC # BLD AUTO: 5.06 MILLION/UL (ref 3.81–5.12)
RBC #/AREA URNS AUTO: ABNORMAL /HPF
SODIUM SERPL-SCNC: 143 MMOL/L (ref 135–147)
SP GR UR STRIP.AUTO: 1.02 (ref 1–1.03)
T3 SERPL-MCNC: 1.1 NG/ML
T4 FREE SERPL-MCNC: 0.81 NG/DL (ref 0.61–1.12)
TRIGL SERPL-MCNC: 102 MG/DL
TSH SERPL DL<=0.05 MIU/L-ACNC: 3.88 UIU/ML (ref 0.45–4.5)
UROBILINOGEN UR STRIP-ACNC: <2 MG/DL
WBC # BLD AUTO: 5.9 THOUSAND/UL (ref 4.31–10.16)
WBC #/AREA URNS AUTO: ABNORMAL /HPF

## 2024-05-29 PROCEDURE — 36415 COLL VENOUS BLD VENIPUNCTURE: CPT

## 2024-05-29 PROCEDURE — 80053 COMPREHEN METABOLIC PANEL: CPT

## 2024-05-29 PROCEDURE — 84439 ASSAY OF FREE THYROXINE: CPT

## 2024-05-29 PROCEDURE — 80061 LIPID PANEL: CPT

## 2024-05-29 PROCEDURE — 85025 COMPLETE CBC W/AUTO DIFF WBC: CPT

## 2024-05-29 PROCEDURE — 84480 ASSAY TRIIODOTHYRONINE (T3): CPT

## 2024-05-29 PROCEDURE — 81001 URINALYSIS AUTO W/SCOPE: CPT

## 2024-05-29 PROCEDURE — 84443 ASSAY THYROID STIM HORMONE: CPT

## 2024-05-30 ENCOUNTER — OFFICE VISIT (OUTPATIENT)
Dept: GYNECOLOGY | Facility: CLINIC | Age: 74
End: 2024-05-30
Payer: COMMERCIAL

## 2024-05-30 VITALS — HEIGHT: 58 IN | WEIGHT: 132.2 LBS | BODY MASS INDEX: 27.75 KG/M2

## 2024-05-30 DIAGNOSIS — N81.4 UTEROVAGINAL PROLAPSE: ICD-10-CM

## 2024-05-30 DIAGNOSIS — Z01.419 ENCOUNTER FOR GYNECOLOGICAL EXAMINATION WITHOUT ABNORMAL FINDING: Primary | ICD-10-CM

## 2024-05-30 PROCEDURE — G0101 CA SCREEN;PELVIC/BREAST EXAM: HCPCS | Performed by: OBSTETRICS & GYNECOLOGY

## 2024-05-30 PROCEDURE — G0145 SCR C/V CYTO,THINLAYER,RESCR: HCPCS | Performed by: OBSTETRICS & GYNECOLOGY

## 2024-05-30 NOTE — PROGRESS NOTES
"Ambulatory Visit  Name: Terese Terry      : 1950      MRN: [id number]  Encounter Provider: Arnaud Virk DO  Encounter Date: 2024   Encounter department: [facility]    Assessment & Plan   1. Encounter for gynecological examination without abnormal finding  2. Uterovaginal prolapse    Screening mammogram ordered.  Return to the office in 4 to 5 months for pessary check    History of Present Illness     Terese Terry is a 73 y.o. female who presents for annual examination and pessary check.  She was fit with a #5 ring pessary in 2023.  Her last pessary check was in 2024.  She denies any vaginal irritation, burning, discharge or bleeding.  Denies any dysuria, hematuria urgency or urinary incontinence or need for stenting to void.  No GI complaints.    Review of Systems   Constitutional: Negative.    HENT:  Negative for sore throat and trouble swallowing.    Gastrointestinal: Negative.    Genitourinary: Negative.        Objective     Ht 4' 10\" (1.473 m)   Wt 60 kg (132 lb 3.2 oz)   BMI 27.63 kg/m²     Physical Exam  Vitals reviewed.   Cardiovascular:      Rate and Rhythm: Normal rate and regular rhythm.      Pulses: Normal pulses.      Heart sounds: Normal heart sounds. No murmur heard.  Pulmonary:      Effort: Pulmonary effort is normal. No respiratory distress.      Breath sounds: Normal breath sounds.   Chest:   Breasts:     Right: No swelling, bleeding, inverted nipple, mass, nipple discharge, skin change or tenderness.      Left: No swelling, bleeding, inverted nipple, mass, nipple discharge, skin change or tenderness.   Abdominal:      General: There is no distension.      Palpations: Abdomen is soft. There is no mass.      Tenderness: There is no abdominal tenderness. There is no guarding or rebound.      Hernia: No hernia is present. There is no hernia in the left inguinal area or right inguinal area.   Genitourinary:     General: " Normal vulva.      Labia:         Right: No rash, tenderness or lesion.         Left: No rash, tenderness or lesion.       Comments: Stage II uterovaginal prolapse.  No palpable adnexal masses or tenderness.  Pessary removed cleansed and reinserted.  Bartholin's and [place]'s glands normal.  Urethral orifice normal.  Vagina with atrophic changes cervix appears normal uterus is normal size and contour freely mobile with stage II prolapse  Musculoskeletal:      Cervical back: Normal range of motion and neck supple. No tenderness.   Lymphadenopathy:      Cervical: No cervical adenopathy.      Upper Body:      Right upper body: No supraclavicular, axillary or pectoral adenopathy.      Left upper body: No supraclavicular, axillary or pectoral adenopathy.      Lower Body: No right inguinal adenopathy. No left inguinal adenopathy.   Neurological:      Mental Status: She is alert.       Administrative Statements

## 2024-06-10 LAB
LAB AP GYN PRIMARY INTERPRETATION: NORMAL
Lab: NORMAL

## 2024-11-06 ENCOUNTER — OFFICE VISIT (OUTPATIENT)
Dept: GYNECOLOGY | Facility: CLINIC | Age: 74
End: 2024-11-06
Payer: COMMERCIAL

## 2024-11-06 VITALS
HEART RATE: 88 BPM | HEIGHT: 58 IN | BODY MASS INDEX: 27.29 KG/M2 | SYSTOLIC BLOOD PRESSURE: 130 MMHG | WEIGHT: 130 LBS | DIASTOLIC BLOOD PRESSURE: 84 MMHG

## 2024-11-06 DIAGNOSIS — N81.2 UTEROVAGINAL PROLAPSE, INCOMPLETE: Primary | ICD-10-CM

## 2024-11-06 PROCEDURE — 99213 OFFICE O/P EST LOW 20 MIN: CPT | Performed by: OBSTETRICS & GYNECOLOGY

## 2024-11-06 RX ORDER — ESTRADIOL 0.1 MG/G
1 CREAM VAGINAL WEEKLY
Qty: 42.5 G | Refills: 3 | Status: SHIPPED | OUTPATIENT
Start: 2024-11-06

## 2024-11-06 NOTE — PROGRESS NOTES
Ambulatory Visit  Name: Terese Terry      : 1950      MRN: [id number]  Encounter Provider: Arnaud Virk DO  Encounter Date: 2024   Encounter department: [facility]    Assessment & Plan  Uterovaginal prolapse, incomplete    Orders:    estradiol (ESTRACE) 0.1 mg/g vaginal cream; Insert 1 g into the vagina once a week      History of Present Illness     Terese Terry is a 74 y.o. female who presents for pessary check    Review of Systems  Past Medical History   Past Medical History:   Diagnosis Date    Hypertension     Kidney stone 2022    Urinary incontinence 10/10/2019     Past Surgical History:   Procedure Laterality Date    APPENDECTOMY      CHOLECYSTECTOMY      FL RETROGRADE PYELOGRAM  2022    FL RETROGRADE PYELOGRAM  2023    MS CYSTO BLADDER W/URETERAL CATHETERIZATION Right 2022    Procedure: CYSTOSCOPY RETROGRADE PYELOGRAM WITH INSERTION STENT URETERAL;  Surgeon: Betty Alfred MD;  [facility];  Service: Urology    MS CYSTO/URETERO W/LITHOTRIPSY &INDWELL STENT INSRT Right 2023    Procedure: CYSTOSCOPY URETEROSCOPY , RETROGRADE PYELOGRAM AND removeal and reinsertion of  ureteral mstent STENT URETERAL;  Surgeon: Nicho Brunson MD;  [facility]: [facility];  Service: Urology    TUBAL LIGATION  1980     Family History   Problem Relation Age of Onset    Hypertension Mother      Current Outpatient Medications on File Prior to Visit   Medication Sig Dispense Refill    amLODIPine (NORVASC) 5 mg tablet Take 5 mg by mouth daily at bedtime      Cholecalciferol (Vitamin D3) 125 MCG (5000 UT) TABS Take 5,000 Units by mouth daily      diclofenac potassium (CATAFLAM) 50 mg tablet Take 1 tablet (50 mg total) by mouth 2 (two) times a day for 5 days 10 tablet 0    Menaquinone-7 (Vitamin K2) 100 MCG CAPS Take 100 mcg by mouth Daily at 2am      potassium chloride (Klor-Con) 10 mEq tablet Take 10 mEq by mouth daily at  "bedtime      [DISCONTINUED] hydrochlorothiazide (HYDRODIURIL) 25 mg tablet Take 25 mg by mouth daily       No current facility-administered medications on file prior to visit.   No Known Allergies   Current Outpatient Medications on File Prior to Visit   Medication Sig Dispense Refill    amLODIPine (NORVASC) 5 mg tablet Take 5 mg by mouth daily at bedtime      Cholecalciferol (Vitamin D3) 125 MCG (5000 UT) TABS Take 5,000 Units by mouth daily      diclofenac potassium (CATAFLAM) 50 mg tablet Take 1 tablet (50 mg total) by mouth 2 (two) times a day for 5 days 10 tablet 0    Menaquinone-7 (Vitamin K2) 100 MCG CAPS Take 100 mcg by mouth Daily at 2am      potassium chloride (Klor-Con) 10 mEq tablet Take 10 mEq by mouth daily at bedtime      [DISCONTINUED] hydrochlorothiazide (HYDRODIURIL) 25 mg tablet Take 25 mg by mouth daily       No current facility-administered medications on file prior to visit.      Social History     Tobacco Use    Smoking status: Former     Current packs/day: 0.00     Average packs/day: 1 pack/day for 38.0 years (38.0 ttl pk-yrs)     Types: Cigarettes     Start date: 1964     Quit date: 2002     Years since quittin.8    Smokeless tobacco: Never   Vaping Use    Vaping status: Never Used   Substance and Sexual Activity    Alcohol use: Not Currently    Drug use: No    Sexual activity: Not Currently     Partners: Male     Birth control/protection: None         Objective     /84   Pulse 88   Ht 4' 10\" (1.473 m)   Wt 59 kg (130 lb)   BMI 27.17 kg/m²     Physical Exam  Vitals reviewed.   Abdominal:      General: There is no distension.      Palpations: Abdomen is soft. There is no mass.      Tenderness: There is no abdominal tenderness. There is no guarding or rebound.      Hernia: No hernia is present. There is no hernia in the left inguinal area or right inguinal area.   Genitourinary:     General: Normal vulva.      Labia:         Right: No rash, tenderness or lesion.         " Left: No rash, tenderness or lesion.       Comments: Atrophic changes.  Stage II uterovaginal prolapse.  Uterus is normal size and contour freely mobile.  No palpable adnexal masses or tenderness.  Urethral orifice normal.  No cystocele or rectocele.  Bartholin's and [place]'s glands normal.  #5 ring pessary removed cleansed and reinserted  Lymphadenopathy:      Lower Body: No right inguinal adenopathy. No left inguinal adenopathy.   Neurological:      Mental Status: She is alert.

## 2025-03-20 ENCOUNTER — OFFICE VISIT (OUTPATIENT)
Dept: GYNECOLOGY | Facility: CLINIC | Age: 75
End: 2025-03-20
Payer: COMMERCIAL

## 2025-03-20 VITALS
HEIGHT: 59 IN | BODY MASS INDEX: 25.2 KG/M2 | HEART RATE: 67 BPM | WEIGHT: 125 LBS | DIASTOLIC BLOOD PRESSURE: 82 MMHG | SYSTOLIC BLOOD PRESSURE: 138 MMHG

## 2025-03-20 DIAGNOSIS — N81.2 UTEROVAGINAL PROLAPSE, INCOMPLETE: Primary | ICD-10-CM

## 2025-03-20 PROCEDURE — 99213 OFFICE O/P EST LOW 20 MIN: CPT | Performed by: OBSTETRICS & GYNECOLOGY

## 2025-03-20 NOTE — PROGRESS NOTES
Name: Terese Terry      : 1950      MRN: [id number]  Encounter Provider: Arnaud Virk DO  Encounter Date: 3/20/2025   Encounter department: [facility]  :  Assessment & Plan  Uterovaginal prolapse, incomplete             History of Present Illness   HPI  Terese Terry is a 74 y.o. female who presents for pessary check.  Patient denies any vaginal irritation, burning discharge or bleeding.  No difficulties voiding or having a bowel movement.      Review of Systems  Past Medical History   Past Medical History:   Diagnosis Date    Hypertension     Kidney stone 2022    Urinary incontinence 10/10/2019     Past Surgical History:   Procedure Laterality Date    APPENDECTOMY      CHOLECYSTECTOMY      FL RETROGRADE PYELOGRAM  2022    FL RETROGRADE PYELOGRAM  2023    FL CYSTO BLADDER W/URETERAL CATHETERIZATION Right 2022    Procedure: CYSTOSCOPY RETROGRADE PYELOGRAM WITH INSERTION STENT URETERAL;  Surgeon: Betty Alfred MD;  [facility];  Service: Urology    FL CYSTO/URETERO W/LITHOTRIPSY &INDWELL STENT INSRT Right 2023    Procedure: CYSTOSCOPY URETEROSCOPY , RETROGRADE PYELOGRAM AND removeal and reinsertion of  ureteral mstent STENT URETERAL;  Surgeon: Nicho Brunson MD;  [facility];  Service: Urology    TUBAL LIGATION  1980     Family History   Problem Relation Age of Onset    Hypertension Mother       reports that she quit smoking about 23 years ago. Her smoking use included cigarettes. She started smoking about 61 years ago. She has a 38 pack-year smoking history. She has never used smokeless tobacco. She reports that she does not currently use alcohol. She reports that she does not use drugs.  Current Outpatient Medications   Medication Instructions    amLODIPine (NORVASC) 5 mg, Oral, Daily at bedtime    diclofenac potassium (CATAFLAM) 50 mg, Oral, 2 times daily    estradiol (ESTRACE) 1 g, Vaginal, Weekly  "   potassium chloride (Klor-Con) 10 mEq tablet 10 mEq, Oral, Daily at bedtime    Vitamin D3 5,000 Units, Oral, Daily    Vitamin K2 100 mcg, Oral, Daily  (0200)   No Known Allergies   Current Outpatient Medications on File Prior to Visit   Medication Sig Dispense Refill    amLODIPine (NORVASC) 5 mg tablet Take 5 mg by mouth daily at bedtime      Cholecalciferol (Vitamin D3) 125 MCG (5000 UT) TABS Take 5,000 Units by mouth daily      diclofenac potassium (CATAFLAM) 50 mg tablet Take 1 tablet (50 mg total) by mouth 2 (two) times a day for 5 days 10 tablet 0    estradiol (ESTRACE) 0.1 mg/g vaginal cream Insert 1 g into the vagina once a week 42.5 g 3    Menaquinone-7 (Vitamin K2) 100 MCG CAPS Take 100 mcg by mouth Daily at 2am      potassium chloride (Klor-Con) 10 mEq tablet Take 10 mEq by mouth daily at bedtime       No current facility-administered medications on file prior to visit.      Social History     Tobacco Use    Smoking status: Former     Current packs/day: 0.00     Average packs/day: 1 pack/day for 38.0 years (38.0 ttl pk-yrs)     Types: Cigarettes     Start date: 1964     Quit date: 2002     Years since quittin.2    Smokeless tobacco: Never   Vaping Use    Vaping status: Never Used   Substance and Sexual Activity    Alcohol use: Not Currently    Drug use: No    Sexual activity: Not Currently     Partners: Male     Birth control/protection: None        Objective   /82   Pulse 67   Ht 4' 11\" (1.499 m)   Wt 56.7 kg (125 lb)   BMI 25.25 kg/m²      Physical Exam  #5 ring pessary removed cleansed and reinserted.  Pelvic exam the uterus is anteverted normal size and contour freely mobile.  Stage II prolapse.  No palp adnexal masses or tenderness.  Vagina atrophic.  No lesions noted.  Bartholin's and [place]'s glands normal.  Urethral orifice normal.    "

## 2025-05-09 ENCOUNTER — OFFICE VISIT (OUTPATIENT)
Dept: URGENT CARE | Facility: CLINIC | Age: 75
End: 2025-05-09
Payer: COMMERCIAL

## 2025-05-09 VITALS
OXYGEN SATURATION: 99 % | SYSTOLIC BLOOD PRESSURE: 154 MMHG | HEART RATE: 101 BPM | DIASTOLIC BLOOD PRESSURE: 76 MMHG | TEMPERATURE: 97.3 F | RESPIRATION RATE: 18 BRPM

## 2025-05-09 DIAGNOSIS — K59.00 CONSTIPATION, UNSPECIFIED CONSTIPATION TYPE: Primary | ICD-10-CM

## 2025-05-09 DIAGNOSIS — K64.8 OTHER HEMORRHOIDS: ICD-10-CM

## 2025-05-09 PROCEDURE — 99213 OFFICE O/P EST LOW 20 MIN: CPT | Performed by: PHYSICIAN ASSISTANT

## 2025-05-09 PROCEDURE — S9088 SERVICES PROVIDED IN URGENT: HCPCS | Performed by: PHYSICIAN ASSISTANT

## 2025-05-09 RX ORDER — HYDROCORTISONE 25 MG/G
CREAM TOPICAL 2 TIMES DAILY
Qty: 28 G | Refills: 0 | Status: SHIPPED | OUTPATIENT
Start: 2025-05-09

## 2025-05-09 NOTE — PROGRESS NOTES
Patient Name: Terese Terry      : 1950      MRN: [id number]  Encounter Provider: Maddie Moreno PA-C  Encounter Date: May 9, 2025  Encounter department: [facility]    Assessment & Plan  Constipation, unspecified constipation type  Recommended laxative medication to help patient with constipation.  Reviewed different types of medications that patient can try over-the-counter.  Also would recommend that she increase the amount of water she drinks daily as well as fiber containing foods.       Other hemorrhoids  Use topical hydrocortisone cream to help reduce swelling and pain related to hemorrhoids.  Orders:    hydrocortisone 2.5 % cream; Apply topically 2 (two) times a day          Patient Instructions:   Patient Instructions   Reviewed a variety of laxative medications for patient to try.     Recommended hydrocortisone cream for hemorrhoids.     Follow up with PCP in 3-5 days.  Proceed to  ER if symptoms worsen.    If tests are performed, our office will contact you with results only if changes need to made to the care plan discussed with you at the visit. You can review your full results on [facility].     Subjective   Chief Complaint   Patient presents with    Hemorrhoids     A week of constipation, poor PO intake, and has been straining.             Terese Terry is a 74 y.o.female  Constipation  This is a new problem. The current episode started in the past 7 days. The problem is unchanged. The stool is described as pellet-like. She does not have bowel incontinence. She does not have bladder incontinence. She has a low fiber diet. Associated symptoms include abdominal pain and hemorrhoids. Pertinent negatives include no diarrhea, difficulty urinating, fecal incontinence, hematochezia, melena, nausea, rectal pain, vomiting or weight loss. The pain is located in the generalized abdominal region. Pain is described as aching. Past treatments include laxatives.  The treatment provided no relief.       Review of Systems   Constitutional:  Negative for weight loss.   Gastrointestinal:  Positive for abdominal pain, constipation and hemorrhoids. Negative for diarrhea, hematochezia, melena, nausea, rectal pain and vomiting.   Genitourinary:  Negative for difficulty urinating.   All other systems reviewed and are negative.        Current Outpatient Medications:     amLODIPine (NORVASC) 5 mg tablet, Take 5 mg by mouth daily at bedtime, Disp: , Rfl:     Cholecalciferol (Vitamin D3) 125 MCG (5000 UT) TABS, Take 5,000 Units by mouth daily, Disp: , Rfl:     hydrocortisone 2.5 % cream, Apply topically 2 (two) times a day, Disp: 28 g, Rfl: 0    Menaquinone-7 (Vitamin K2) 100 MCG CAPS, Take 100 mcg by mouth Daily at 2am, Disp: , Rfl:     potassium chloride (Klor-Con) 10 mEq tablet, Take 10 mEq by mouth daily at bedtime, Disp: , Rfl:     diclofenac potassium (CATAFLAM) 50 mg tablet, Take 1 tablet (50 mg total) by mouth 2 (two) times a day for 5 days, Disp: 10 tablet, Rfl: 0    estradiol (ESTRACE) 0.1 mg/g vaginal cream, Insert 1 g into the vagina once a week, Disp: 42.5 g, Rfl: 3  Allergies as of 05/09/2025    (No Known Allergies)     Past Medical History:   Diagnosis Date    Hypertension     Kidney stone 11/26/2022    Urinary incontinence 10/10/2019     Past Surgical History:   Procedure Laterality Date    APPENDECTOMY      CHOLECYSTECTOMY      FL RETROGRADE PYELOGRAM  11/27/2022    FL RETROGRADE PYELOGRAM  01/05/2023    MD CYSTO/URETERO W/LITHOTRIPSY &INDWELL STENT INSRT Right 01/05/2023    Procedure: CYSTOSCOPY URETEROSCOPY , RETROGRADE PYELOGRAM AND removeal and reinsertion of  ureteral mstent STENT URETERAL;  Surgeon: Nicho Brunson MD;  [facility];  Service: Urology    MD CYSTOURETHROSCOPY W/URETERAL CATHETERIZATION Right 11/27/2022    Procedure: CYSTOSCOPY RETROGRADE PYELOGRAM WITH INSERTION STENT URETERAL;  Surgeon: Betty Alfred MD;  [facility]: [facility];  Service:  Urology    TUBAL LIGATION  5/20/1980     Family History   Problem Relation Age of Onset    Hypertension Mother         Objective   /76   Pulse 101   Temp (!) 97.3 °F (36.3 °C) (Temporal)   Resp 18   SpO2 99%      Physical Exam  Vitals and nursing note reviewed.   Constitutional:       Appearance: Normal appearance.   Cardiovascular:      Rate and Rhythm: Normal rate and regular rhythm.   Pulmonary:      Effort: Pulmonary effort is normal.      Breath sounds: Normal breath sounds.   Abdominal:      General: Abdomen is flat. Bowel sounds are normal.      Palpations: Abdomen is soft.      Tenderness: There is abdominal tenderness in the right lower quadrant and left lower quadrant.   Genitourinary:     Comments: Small external hemorrhoid with no bleeding.   Skin:     General: Skin is warm and dry.      Capillary Refill: Capillary refill takes less than 2 seconds.   Neurological:      General: No focal deficit present.      Mental Status: She is alert and oriented to person, place, and time.   Psychiatric:         Mood and Affect: Mood normal.         Behavior: Behavior normal.

## 2025-05-09 NOTE — PATIENT INSTRUCTIONS
Reviewed a variety of laxative medications for patient to try.     Recommended hydrocortisone cream for hemorrhoids.     Follow up with PCP in 3-5 days.  Proceed to  ER if symptoms worsen.    If tests are performed, our office will contact you with results only if changes need to made to the care plan discussed with you at the visit. You can review your full results on [facility].

## 2025-05-21 ENCOUNTER — TRANSCRIBE ORDERS (OUTPATIENT)
Dept: LAB | Facility: AMBULARY SURGERY CENTER | Age: 75
End: 2025-05-21

## 2025-05-21 ENCOUNTER — APPOINTMENT (OUTPATIENT)
Dept: LAB | Facility: AMBULARY SURGERY CENTER | Age: 75
End: 2025-05-21
Payer: COMMERCIAL

## 2025-05-21 DIAGNOSIS — N23: Primary | ICD-10-CM

## 2025-05-21 DIAGNOSIS — N23: ICD-10-CM

## 2025-05-21 LAB
ALBUMIN SERPL BCG-MCNC: 3.6 G/DL (ref 3.5–5)
ALP SERPL-CCNC: 68 U/L (ref 34–104)
ALT SERPL W P-5'-P-CCNC: 23 U/L (ref 7–52)
ANION GAP SERPL CALCULATED.3IONS-SCNC: 10 MMOL/L (ref 4–13)
AST SERPL W P-5'-P-CCNC: 25 U/L (ref 13–39)
BACTERIA UR QL AUTO: ABNORMAL /HPF
BASOPHILS # BLD AUTO: 0.03 THOUSANDS/ÂΜL (ref 0–0.1)
BASOPHILS NFR BLD AUTO: 0 % (ref 0–1)
BILIRUB SERPL-MCNC: 0.67 MG/DL (ref 0.2–1)
BILIRUB UR QL STRIP: NEGATIVE
BUN SERPL-MCNC: 14 MG/DL (ref 5–25)
CALCIUM SERPL-MCNC: 9.3 MG/DL (ref 8.4–10.2)
CHLORIDE SERPL-SCNC: 104 MMOL/L (ref 96–108)
CHOLEST SERPL-MCNC: 195 MG/DL (ref ?–200)
CLARITY UR: ABNORMAL
CO2 SERPL-SCNC: 25 MMOL/L (ref 21–32)
COLOR UR: YELLOW
CREAT SERPL-MCNC: 0.88 MG/DL (ref 0.6–1.3)
EOSINOPHIL # BLD AUTO: 0.39 THOUSAND/ÂΜL (ref 0–0.61)
EOSINOPHIL NFR BLD AUTO: 5 % (ref 0–6)
ERYTHROCYTE [DISTWIDTH] IN BLOOD BY AUTOMATED COUNT: 15.6 % (ref 11.6–15.1)
GFR SERPL CREATININE-BSD FRML MDRD: 64 ML/MIN/1.73SQ M
GLUCOSE P FAST SERPL-MCNC: 96 MG/DL (ref 65–99)
GLUCOSE UR STRIP-MCNC: NEGATIVE MG/DL
HCT VFR BLD AUTO: 35.2 % (ref 34.8–46.1)
HDLC SERPL-MCNC: 54 MG/DL
HGB BLD-MCNC: 10.8 G/DL (ref 11.5–15.4)
HGB UR QL STRIP.AUTO: NEGATIVE
IMM GRANULOCYTES # BLD AUTO: 0.04 THOUSAND/UL (ref 0–0.2)
IMM GRANULOCYTES NFR BLD AUTO: 1 % (ref 0–2)
KETONES UR STRIP-MCNC: NEGATIVE MG/DL
LDLC SERPL CALC-MCNC: 122 MG/DL (ref 0–100)
LEUKOCYTE ESTERASE UR QL STRIP: ABNORMAL
LYMPHOCYTES # BLD AUTO: 1.35 THOUSANDS/ÂΜL (ref 0.6–4.47)
LYMPHOCYTES NFR BLD AUTO: 15 % (ref 14–44)
MCH RBC QN AUTO: 25.4 PG (ref 26.8–34.3)
MCHC RBC AUTO-ENTMCNC: 30.7 G/DL (ref 31.4–37.4)
MCV RBC AUTO: 83 FL (ref 82–98)
MONOCYTES # BLD AUTO: 0.65 THOUSAND/ÂΜL (ref 0.17–1.22)
MONOCYTES NFR BLD AUTO: 7 % (ref 4–12)
MUCOUS THREADS UR QL AUTO: ABNORMAL
NEUTROPHILS # BLD AUTO: 6.28 THOUSANDS/ÂΜL (ref 1.85–7.62)
NEUTS SEG NFR BLD AUTO: 72 % (ref 43–75)
NITRITE UR QL STRIP: NEGATIVE
NON-SQ EPI CELLS URNS QL MICRO: ABNORMAL /HPF
NONHDLC SERPL-MCNC: 141 MG/DL
NRBC BLD AUTO-RTO: 0 /100 WBCS
PH UR STRIP.AUTO: 7 [PH]
PLATELET # BLD AUTO: 258 THOUSANDS/UL (ref 149–390)
PMV BLD AUTO: 10.9 FL (ref 8.9–12.7)
POTASSIUM SERPL-SCNC: 4.1 MMOL/L (ref 3.5–5.3)
PROT SERPL-MCNC: 7.6 G/DL (ref 6.4–8.4)
PROT UR STRIP-MCNC: ABNORMAL MG/DL
RBC # BLD AUTO: 4.25 MILLION/UL (ref 3.81–5.12)
RBC #/AREA URNS AUTO: ABNORMAL /HPF
SODIUM SERPL-SCNC: 139 MMOL/L (ref 135–147)
SP GR UR STRIP.AUTO: 1.02 (ref 1–1.03)
T3 SERPL-MCNC: 0.7 NG/ML (ref 0.9–1.8)
T4 FREE SERPL-MCNC: 1.05 NG/DL (ref 0.61–1.12)
TRIGL SERPL-MCNC: 96 MG/DL (ref ?–150)
TSH SERPL DL<=0.05 MIU/L-ACNC: 2.69 UIU/ML (ref 0.45–4.5)
UROBILINOGEN UR STRIP-ACNC: <2 MG/DL
WBC # BLD AUTO: 8.74 THOUSAND/UL (ref 4.31–10.16)
WBC #/AREA URNS AUTO: ABNORMAL /HPF

## 2025-05-21 PROCEDURE — 85025 COMPLETE CBC W/AUTO DIFF WBC: CPT

## 2025-05-21 PROCEDURE — 80061 LIPID PANEL: CPT

## 2025-05-21 PROCEDURE — 80053 COMPREHEN METABOLIC PANEL: CPT

## 2025-05-21 PROCEDURE — 36415 COLL VENOUS BLD VENIPUNCTURE: CPT

## 2025-05-21 PROCEDURE — 81001 URINALYSIS AUTO W/SCOPE: CPT

## 2025-05-21 PROCEDURE — 84443 ASSAY THYROID STIM HORMONE: CPT

## 2025-05-21 PROCEDURE — 84439 ASSAY OF FREE THYROXINE: CPT

## 2025-05-21 PROCEDURE — 84480 ASSAY TRIIODOTHYRONINE (T3): CPT

## 2025-05-30 ENCOUNTER — HOSPITAL ENCOUNTER (OUTPATIENT)
Dept: CT IMAGING | Facility: HOSPITAL | Age: 75
Discharge: HOME | End: 2025-05-30
Attending: INTERNAL MEDICINE
Payer: COMMERCIAL

## 2025-05-30 DIAGNOSIS — R52 PAIN: ICD-10-CM

## 2025-05-30 PROCEDURE — 74177 CT ABD & PELVIS W/CONTRAST: CPT

## 2025-05-30 RX ADMIN — IOHEXOL 85 ML: 350 INJECTION, SOLUTION INTRAVENOUS at 14:10

## 2025-08-07 ENCOUNTER — PREP FOR PROCEDURE (OUTPATIENT)
Dept: SURGERY | Facility: CLINIC | Age: 75
End: 2025-08-07

## 2025-08-07 ENCOUNTER — OFFICE VISIT (OUTPATIENT)
Dept: SURGERY | Facility: CLINIC | Age: 75
End: 2025-08-07
Payer: COMMERCIAL

## 2025-08-07 VITALS
BODY MASS INDEX: 24.19 KG/M2 | HEART RATE: 68 BPM | OXYGEN SATURATION: 97 % | HEIGHT: 59 IN | SYSTOLIC BLOOD PRESSURE: 173 MMHG | WEIGHT: 120 LBS | DIASTOLIC BLOOD PRESSURE: 83 MMHG

## 2025-08-07 DIAGNOSIS — K40.90 RIGHT INGUINAL HERNIA: Primary | ICD-10-CM

## 2025-08-07 DIAGNOSIS — K40.90 INGUINAL HERNIA: Primary | ICD-10-CM

## 2025-08-07 PROCEDURE — 99204 OFFICE O/P NEW MOD 45 MIN: CPT | Performed by: SURGERY

## 2025-08-21 ENCOUNTER — OFFICE VISIT (OUTPATIENT)
Dept: GYNECOLOGY | Facility: CLINIC | Age: 75
End: 2025-08-21
Payer: COMMERCIAL

## 2025-08-21 VITALS
HEART RATE: 65 BPM | DIASTOLIC BLOOD PRESSURE: 84 MMHG | SYSTOLIC BLOOD PRESSURE: 168 MMHG | BODY MASS INDEX: 24.19 KG/M2 | HEIGHT: 59 IN | WEIGHT: 120 LBS

## 2025-08-21 DIAGNOSIS — N95.2 ATROPHIC VAGINITIS: ICD-10-CM

## 2025-08-21 DIAGNOSIS — N81.2 UTEROVAGINAL PROLAPSE, INCOMPLETE: Primary | ICD-10-CM

## 2025-08-21 PROCEDURE — 99213 OFFICE O/P EST LOW 20 MIN: CPT | Performed by: OBSTETRICS & GYNECOLOGY

## 2025-08-21 RX ORDER — ESTRADIOL 0.1 MG/G
1 CREAM VAGINAL WEEKLY
Qty: 42.5 G | Refills: 3 | Status: SHIPPED | OUTPATIENT
Start: 2025-08-21

## 2025-08-22 ENCOUNTER — TELEPHONE (OUTPATIENT)
Age: 75
End: 2025-08-22

## 2025-08-22 ENCOUNTER — TELEPHONE (OUTPATIENT)
Dept: SURGERY | Facility: CLINIC | Age: 75
End: 2025-08-22

## (undated) DEVICE — STERILE CYSTO PACK: Brand: CARDINAL HEALTH

## (undated) DEVICE — GLOVE SRG BIOGEL ECLIPSE 7.5

## (undated) DEVICE — SPECIMEN CONTAINER STERILE PEEL PACK

## (undated) DEVICE — GLOVE INDICATOR PI UNDERGLOVE SZ 8 BLUE

## (undated) DEVICE — CATH URETERAL 5FR X 70 CM FLEX TIP POLYUR BARD

## (undated) DEVICE — GUIDEWIRE STRGHT TIP 0.035 IN  SOLO PLUS

## (undated) DEVICE — PREMIUM DRY TRAY LF: Brand: MEDLINE INDUSTRIES, INC.

## (undated) DEVICE — SYRINGE 10ML LL

## (undated) DEVICE — PACK TUR

## (undated) DEVICE — SYRINGE 50ML LL

## (undated) DEVICE — GLOVE INDICATOR PI UNDERGLOVE SZ 7 BLUE

## (undated) DEVICE — CHLORHEXIDINE 4PCT 4 OZ

## (undated) DEVICE — INVIEW CLEAR LEGGINGS: Brand: CONVERTORS